# Patient Record
Sex: MALE | Race: WHITE | NOT HISPANIC OR LATINO | Employment: FULL TIME | URBAN - METROPOLITAN AREA
[De-identification: names, ages, dates, MRNs, and addresses within clinical notes are randomized per-mention and may not be internally consistent; named-entity substitution may affect disease eponyms.]

---

## 2023-11-27 ENCOUNTER — OFFICE VISIT (OUTPATIENT)
Dept: PEDIATRICS | Facility: CLINIC | Age: 18
End: 2023-11-27
Payer: COMMERCIAL

## 2023-11-27 ENCOUNTER — ANCILLARY PROCEDURE (OUTPATIENT)
Dept: CT IMAGING | Facility: CLINIC | Age: 18
End: 2023-11-27
Attending: STUDENT IN AN ORGANIZED HEALTH CARE EDUCATION/TRAINING PROGRAM
Payer: COMMERCIAL

## 2023-11-27 ENCOUNTER — OFFICE VISIT (OUTPATIENT)
Dept: URGENT CARE | Facility: URGENT CARE | Age: 18
End: 2023-11-27
Payer: COMMERCIAL

## 2023-11-27 ENCOUNTER — NURSE TRIAGE (OUTPATIENT)
Dept: NURSING | Facility: CLINIC | Age: 18
End: 2023-11-27

## 2023-11-27 VITALS
HEART RATE: 103 BPM | TEMPERATURE: 97.2 F | DIASTOLIC BLOOD PRESSURE: 73 MMHG | OXYGEN SATURATION: 99 % | SYSTOLIC BLOOD PRESSURE: 126 MMHG | RESPIRATION RATE: 18 BRPM

## 2023-11-27 VITALS
OXYGEN SATURATION: 98 % | WEIGHT: 184 LBS | HEART RATE: 89 BPM | DIASTOLIC BLOOD PRESSURE: 88 MMHG | TEMPERATURE: 99.1 F | RESPIRATION RATE: 16 BRPM | SYSTOLIC BLOOD PRESSURE: 134 MMHG

## 2023-11-27 DIAGNOSIS — R07.0 THROAT PAIN: Primary | ICD-10-CM

## 2023-11-27 DIAGNOSIS — R31.0 GROSS HEMATURIA: Primary | ICD-10-CM

## 2023-11-27 DIAGNOSIS — R31.0 GROSS HEMATURIA: ICD-10-CM

## 2023-11-27 DIAGNOSIS — Z11.3 SCREEN FOR STD (SEXUALLY TRANSMITTED DISEASE): ICD-10-CM

## 2023-11-27 DIAGNOSIS — M54.50 ACUTE LOW BACK PAIN, UNSPECIFIED BACK PAIN LATERALITY, UNSPECIFIED WHETHER SCIATICA PRESENT: ICD-10-CM

## 2023-11-27 DIAGNOSIS — R80.8 OTHER PROTEINURIA: ICD-10-CM

## 2023-11-27 DIAGNOSIS — R82.998 DARK BROWN URINE: ICD-10-CM

## 2023-11-27 LAB
ALBUMIN SERPL BCG-MCNC: 4.4 G/DL (ref 3.5–5.2)
ALBUMIN UR-MCNC: >=300 MG/DL
ALP SERPL-CCNC: 60 U/L (ref 40–150)
ALT SERPL W P-5'-P-CCNC: 10 U/L (ref 0–70)
ANION GAP SERPL CALCULATED.3IONS-SCNC: 12 MMOL/L (ref 7–15)
APPEARANCE UR: ABNORMAL
AST SERPL W P-5'-P-CCNC: 23 U/L (ref 0–45)
BILIRUB SERPL-MCNC: 0.8 MG/DL
BILIRUB UR QL STRIP: NEGATIVE
BUN SERPL-MCNC: 12.1 MG/DL (ref 6–20)
CALCIUM SERPL-MCNC: 9.5 MG/DL (ref 8.6–10)
CHLORIDE SERPL-SCNC: 98 MMOL/L (ref 98–107)
COLOR UR AUTO: ABNORMAL
CREAT SERPL-MCNC: 0.96 MG/DL (ref 0.67–1.17)
CRP SERPL-MCNC: 79.9 MG/L
DEPRECATED HCO3 PLAS-SCNC: 27 MMOL/L (ref 22–29)
DEPRECATED S PYO AG THROAT QL EIA: NEGATIVE
EGFRCR SERPLBLD CKD-EPI 2021: >90 ML/MIN/1.73M2
ERYTHROCYTE [DISTWIDTH] IN BLOOD BY AUTOMATED COUNT: 11.6 % (ref 10–15)
FLUAV AG SPEC QL IA: NEGATIVE
FLUBV AG SPEC QL IA: NEGATIVE
GLUCOSE SERPL-MCNC: 90 MG/DL (ref 70–99)
GLUCOSE UR STRIP-MCNC: NEGATIVE MG/DL
GROUP A STREP BY PCR: NOT DETECTED
HCT VFR BLD AUTO: 46 % (ref 40–53)
HGB BLD-MCNC: 16.1 G/DL (ref 13.3–17.7)
HGB UR QL STRIP: ABNORMAL
KETONES UR STRIP-MCNC: NEGATIVE MG/DL
LEUKOCYTE ESTERASE UR QL STRIP: NEGATIVE
MCH RBC QN AUTO: 30.2 PG (ref 26.5–33)
MCHC RBC AUTO-ENTMCNC: 35 G/DL (ref 31.5–36.5)
MCV RBC AUTO: 86 FL (ref 78–100)
NITRATE UR QL: NEGATIVE
PH UR STRIP: 7 [PH] (ref 5–7)
PLATELET # BLD AUTO: 196 10E3/UL (ref 150–450)
POTASSIUM SERPL-SCNC: 4.5 MMOL/L (ref 3.4–5.3)
PROT SERPL-MCNC: 7.9 G/DL (ref 6.4–8.3)
RBC # BLD AUTO: 5.33 10E6/UL (ref 4.4–5.9)
RBC #/AREA URNS AUTO: >100 /HPF
SODIUM SERPL-SCNC: 137 MMOL/L (ref 135–145)
SP GR UR STRIP: 1.01 (ref 1–1.03)
UROBILINOGEN UR STRIP-ACNC: 1 E.U./DL
WBC # BLD AUTO: 10.2 10E3/UL (ref 4–11)
WBC #/AREA URNS AUTO: ABNORMAL /HPF

## 2023-11-27 PROCEDURE — 74176 CT ABD & PELVIS W/O CONTRAST: CPT | Mod: GC | Performed by: RADIOLOGY

## 2023-11-27 PROCEDURE — 86780 TREPONEMA PALLIDUM: CPT | Performed by: PHYSICIAN ASSISTANT

## 2023-11-27 PROCEDURE — 87804 INFLUENZA ASSAY W/OPTIC: CPT | Performed by: PHYSICIAN ASSISTANT

## 2023-11-27 PROCEDURE — 86803 HEPATITIS C AB TEST: CPT | Performed by: PHYSICIAN ASSISTANT

## 2023-11-27 PROCEDURE — 99207 REFERRAL TO ACUTE AND DIAGNOSTIC SERVICES: CPT | Performed by: PHYSICIAN ASSISTANT

## 2023-11-27 PROCEDURE — 85027 COMPLETE CBC AUTOMATED: CPT | Performed by: STUDENT IN AN ORGANIZED HEALTH CARE EDUCATION/TRAINING PROGRAM

## 2023-11-27 PROCEDURE — 86160 COMPLEMENT ANTIGEN: CPT | Performed by: PHYSICIAN ASSISTANT

## 2023-11-27 PROCEDURE — 87591 N.GONORRHOEAE DNA AMP PROB: CPT | Performed by: PHYSICIAN ASSISTANT

## 2023-11-27 PROCEDURE — 86036 ANCA SCREEN EACH ANTIBODY: CPT | Performed by: PHYSICIAN ASSISTANT

## 2023-11-27 PROCEDURE — 99205 OFFICE O/P NEW HI 60 MIN: CPT | Performed by: STUDENT IN AN ORGANIZED HEALTH CARE EDUCATION/TRAINING PROGRAM

## 2023-11-27 PROCEDURE — 86140 C-REACTIVE PROTEIN: CPT | Performed by: STUDENT IN AN ORGANIZED HEALTH CARE EDUCATION/TRAINING PROGRAM

## 2023-11-27 PROCEDURE — 87635 SARS-COV-2 COVID-19 AMP PRB: CPT | Performed by: PHYSICIAN ASSISTANT

## 2023-11-27 PROCEDURE — 87651 STREP A DNA AMP PROBE: CPT | Performed by: PHYSICIAN ASSISTANT

## 2023-11-27 PROCEDURE — 84156 ASSAY OF PROTEIN URINE: CPT | Performed by: PHYSICIAN ASSISTANT

## 2023-11-27 PROCEDURE — 87086 URINE CULTURE/COLONY COUNT: CPT | Performed by: PHYSICIAN ASSISTANT

## 2023-11-27 PROCEDURE — 86160 COMPLEMENT ANTIGEN: CPT | Mod: 59 | Performed by: PHYSICIAN ASSISTANT

## 2023-11-27 PROCEDURE — 86038 ANTINUCLEAR ANTIBODIES: CPT | Performed by: PHYSICIAN ASSISTANT

## 2023-11-27 PROCEDURE — 81001 URINALYSIS AUTO W/SCOPE: CPT | Performed by: PHYSICIAN ASSISTANT

## 2023-11-27 PROCEDURE — 36415 COLL VENOUS BLD VENIPUNCTURE: CPT | Performed by: PHYSICIAN ASSISTANT

## 2023-11-27 PROCEDURE — 86037 ANCA TITER EACH ANTIBODY: CPT | Performed by: PHYSICIAN ASSISTANT

## 2023-11-27 PROCEDURE — 80053 COMPREHEN METABOLIC PANEL: CPT | Performed by: STUDENT IN AN ORGANIZED HEALTH CARE EDUCATION/TRAINING PROGRAM

## 2023-11-27 PROCEDURE — 87491 CHLMYD TRACH DNA AMP PROBE: CPT | Performed by: PHYSICIAN ASSISTANT

## 2023-11-27 PROCEDURE — 87389 HIV-1 AG W/HIV-1&-2 AB AG IA: CPT | Performed by: PHYSICIAN ASSISTANT

## 2023-11-27 ASSESSMENT — PAIN SCALES - GENERAL: PAINLEVEL: EXTREME PAIN (8)

## 2023-11-27 NOTE — PROGRESS NOTES
Assessment & Plan     Gross hematuria  Patient is having worsening low back pain over the weekend that feels different than his normal sciatic back pain. He also has gross hematuria. Discussed case with [facility] and they will accept patient for further workup to rule out kidney stone. Patient agrees with plan and will go by private car.     - Referral to Acute and Diagnostic Services (Day of diagnostic / First order acute); Future  - ANCA IgG by IFA with Reflex to Titer  - Anti Nuclear Maria Del Carmen IgG by IFA with Reflex  - Protein  random urine  - Complement C3  - Complement C4    Acute low back pain, unspecified back pain laterality, unspecified whether sciatica present    - Referral to Acute and Diagnostic Services (Day of diagnostic / First order acute); Future    Throat pain    - Streptococcus A Rapid Screen w/Reflex to PCR - Clinic Collect  - Influenza A & B Antigen - Clinic Collect  - Symptomatic COVID-19 Virus (Coronavirus) by PCR Nose  - Group A Streptococcus PCR Throat Swab    Dark brown urine    - UA Macroscopic with reflex to Microscopic and Culture - Clinic Collect  - Chlamydia trachomatis PCR - Clinic Collect  - Neisseria gonorrhoeae PCR - Clinic Collect  - UA Microscopic with Reflex to Culture  - Urine Culture Aerobic Bacterial - lab collect; Future  - Urine Culture Aerobic Bacterial - lab collect    Screen for STD (sexually transmitted disease)    - HIV Antigen Antibody Combo; Future  - Treponema Abs w Reflex to RPR and Titer; Future  - Hepatitis C Screen Reflex to HCV RNA Quant and Genotype; Future  - Hepatitis C Screen Reflex to HCV RNA Quant and Genotype  - Treponema Abs w Reflex to RPR and Titer  - HIV Antigen Antibody Combo    Other proteinuria    - ANCA IgG by IFA with Reflex to Titer  - Anti Nuclear Maria Del Carmen IgG by IFA with Reflex  - Protein  random urine  - Complement C3  - Complement C4      60 minutes spent by me on the date of the encounter doing chart review, history and exam, documentation and  further activities per the note           No follow-ups on file.    GUERITA Robles [facility]              Subjective   Chief Complaint   Patient presents with    Pharyngitis     3 days ago had cold-like symptoms, then got more sick with high fever, body aches and sore throat.  Urine is discolored and cloudy.       HPI     Fever and dark urine     Onset of symptoms was 1 day(s) ago.  Course of illness is same.    Severity moderate  Current and Associated symptoms: fever, sore throat, body aches  Treatment measures tried include Tylenol/Ibuprofen.  Predisposing factors include None.    Friday night noticed dark urine, no frequency, urgency, burning. Started pushing fluids and noticed urine lightening up. Has been having worsening low back pain. He does have history of disc herniation and had surgery several years ago. He always has some tightness in low back but this pain feels different.                 Review of Systems         Objective    /88   Pulse 89   Temp 99.1  F (37.3  C) (Tympanic)   Resp 16   Wt 83.5 kg (184 lb)   SpO2 98%   There is no height or weight on file to calculate BMI.  Physical Exam  Constitutional:       General: He is not in acute distress.     Appearance: He is well-developed.   HENT:      Head: Normocephalic and atraumatic.      Right Ear: Tympanic membrane and ear canal normal.      Left Ear: Tympanic membrane and ear canal normal.   Eyes:      Conjunctiva/sclera: Conjunctivae normal.   Cardiovascular:      Rate and Rhythm: Normal rate and regular rhythm.   Pulmonary:      Effort: Pulmonary effort is normal.      Breath sounds: Normal breath sounds.   Musculoskeletal:      Comments: There is left low back pain with palpation that patient states is chronic, new pain is not palpable. No CVA tenderness.    Skin:     General: Skin is warm and dry.      Findings: No rash.   Psychiatric:         Behavior: Behavior normal.

## 2023-11-27 NOTE — PROGRESS NOTES
Assessment & Plan     Gross hematuria  Patient referred to [facility] for further evaluation of cola colored urine for the past 2 days.  He had greater than 100 RBCs on UA at urgent care and greater than 300 protein on UA.  I ordered noncontrast CT to rule out kidney stone as the cause for the hematuria and the CT is negative.  CRP is elevated at 79.  His creatinine and GFR are normal.  I called and talked to the  and had him take a look at the urine under the microscope again and confirmed that there are no abnormal red blood cell morphology or acanthocytes seen.  I am concerned about glomerulonephritis as the cause for the cola colored urine and proteinuria.  I called to consult with on-call nephrologist Dr. Bui and given he is not hypertensive and kidney function tests are normal he does not need to be seen in the emergency department tonight but needs emergent nephrology evaluation to be seen in the next 1 to 2 days.  Referral was placed and patient advised to wait for call from  to set up this appointment.  Dr. Bui are also advised on additional urine test for protein/creatinine ratio and lab can add this on and blood tests including C3, C4, ANCA and LUZMARIA to help discern whether this is glomerulonephritis.  It looks like we are able to add on C3, C4 ANCA and LUZMARIA to the blood in lab. Called and talked to patient to update on the additional tests added and to wait for the call from the nephrology  with goal of patient being seen by nephrology in the next couple days per recommendation of Dr. Bui.   - CT Abdomen Pelvis w/o Contrast; Future  - CBC with platelets; Future  - Comprehensive metabolic panel; Future  - Comprehensive metabolic panel  - CBC with platelets  - ESR: Erythrocyte sedimentation rate; Future  - CRP, inflammation; Future  - CRP, inflammation  - Adult Urology  Referral; Future  - Adult Nephrology  Referral; Future  - Protein  random urine; Future  -  Complement C3; Future  - Complement C4; Future  - ANCA IgG by IFA with Reflex to Titer; Future  - Anti Nuclear Maria Del Carmen IgG by IFA with Reflex; Future    Other proteinuria  - Adult Urology  Referral; Future  - Adult Nephrology  Referral; Future  - Protein  random urine; Future  - Complement C3; Future  - Complement C4; Future  - ANCA IgG by IFA with Reflex to Titer; Future  - Anti Nuclear Maria Del Carmen IgG by IFA with Reflex; Future      60 minutes spent by me on the date of the encounter doing chart review, review of test results, interpretation of tests, patient visit, documentation, and discussion with other provider(s)        There are no Patient Instructions on file for this visit.    No follow-ups on file.    DEB Chin [facility] GABY Mao is a 18 year old, presenting for the following health issues:  Kidney Problem      HPI     Concern - R/O Kidney Stone  Onset: cola colored urine started yesterday. He thought he was dehydrated so he started drinking a lot of fluids and electrolytes but the brown color to the urine persisted.  He has also had left lower back pain and sciatic pain down the left leg.  He has a history of lumbar radiculopathy and has had surgery to the lumbar spine in the past by [facility] 3 years ago.  He has had some general symptoms of not feeling well and the right side of his throat has been hurting.  He had a strep and influenza test done in urgent care earlier today that were negative.  Description: pain in lower back and left leg.  Intensity: severe, 8/10  Progression of Symptoms:  worsening and constant  Accompanying Signs & Symptoms: pain from back radiates to the left flank  Previous history of similar problem: No history of kidney stone but mother has had them in the past.  Precipitating factors:        Worsened by: extending the left leg or sitting to long.  Alleviating factors:        Improved by: none  Therapies  tried and outcome: None  Is not taking any supplements or over the counter medications.   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 These symptoms resolved with treatment by cardiology.     There is no problem list on file for this patient.    No current outpatient medications on file.     No current facility-administered medications for this visit.         Review of Systems   Constitutional, HEENT, cardiovascular, pulmonary, GI, , musculoskeletal, neuro, skin, endocrine and psych systems are negative, except as otherwise noted.      Objective    /73 (BP Location: Right arm, Patient Position: Sitting, Cuff Size: Adult Large)   Pulse 103   Temp 97.2  F (36.2  C) (Oral)   Resp 18   SpO2 99%   There is no height or weight on file to calculate BMI.  Physical Exam   GENERAL: healthy, alert and no distress  EYES: Eyes grossly normal to inspection, PERRL and conjunctivae and sclerae normal  NECK: tenderness to palpation of SCM muscle on right side, no adenopathy adjacent to SCM, no asymmetry, masses, or scars, and thyroid normal to palpation  RESP: lungs clear to auscultation - no rales, rhonchi or wheezes  CV: regular rate and rhythm, normal S1 S2, no S3 or S4, no murmur, click or rub, no peripheral edema   ABDOMEN: soft, nontender, no hepatosplenomegaly, no masses  MS: no gross musculoskeletal defects noted, no edema  SKIN: no suspicious lesions or rashes  NEURO: Normal strength and tone, mentation intact and speech normal  BACK: left lumbar paraspinal tenderness  PSYCH: mentation appears normal, affect normal/bright    Results for orders placed or performed in visit on 11/27/23   CT Abdomen Pelvis w/o Contrast     Status: None (Preliminary result)    Narrative    EXAMINATION: CT ABDOMEN PELVIS W/O CONTRAST  11/27/2023 4:59 PM      CLINICAL HISTORY: hematuria r/o kidney stone; Gross hematuria    COMPARISON: None        PROCEDURE COMMENTS: CT of the chest,  abdomen, and pelvis was performed  without intravenous contrast. Axial MIP  images of the chest, and  coronal and sagittal reformatted images of the chest, abdomen, and  pelvis obtained. Exam was performed in the prone position.    FINDINGS:    Support devices: None.    Lungs: No pneumothorax or pleural effusion. No focal pulmonary  consolidation. Normal heart size. No pericardial effusion.    Abdomen/pelvis:  The liver and biliary system, spleen, pancreas are normal in  appearance.  The adrenal glands and kidneys are normal in appearance. The bladder  is unremarkable.    There are no abnormally dilated or thickened loops of small bowel or  colon.    There is no free air or free fluid. There are no abnormally sized  lymph nodes.    Bones:   Normal.      Impression    IMPRESSION:  Normal CT of the abdomen and pelvis, specifically, there is no  evidence of obstructive nephrolithiasis.   Results for orders placed or performed in visit on 11/27/23   Comprehensive metabolic panel     Status: Normal   Result Value Ref Range    Sodium 137 135 - 145 mmol/L    Potassium 4.5 3.4 - 5.3 mmol/L    Carbon Dioxide (CO2) 27 22 - 29 mmol/L    Anion Gap 12 7 - 15 mmol/L    Urea Nitrogen 12.1 6.0 - 20.0 mg/dL    Creatinine 0.96 0.67 - 1.17 mg/dL    GFR Estimate >90 >60 mL/min/1.73m2    Calcium 9.5 8.6 - 10.0 mg/dL    Chloride 98 98 - 107 mmol/L    Glucose 90 70 - 99 mg/dL    Alkaline Phosphatase 60 40 - 150 U/L    AST 23 0 - 45 U/L    ALT 10 0 - 70 U/L    Protein Total 7.9 6.4 - 8.3 g/dL    Albumin 4.4 3.5 - 5.2 g/dL    Bilirubin Total 0.8 <=1.2 mg/dL   CBC with platelets     Status: Normal   Result Value Ref Range    WBC Count 10.2 4.0 - 11.0 10e3/uL    RBC Count 5.33 4.40 - 5.90 10e6/uL    Hemoglobin 16.1 13.3 - 17.7 g/dL    Hematocrit 46.0 40.0 - 53.0 %    MCV 86 78 - 100 fL    MCH 30.2 26.5 - 33.0 pg    MCHC 35.0 31.5 - 36.5 g/dL    RDW 11.6 10.0 - 15.0 %    Platelet Count 196 150 - 450 10e3/uL   CRP, inflammation     Status:  Abnormal   Result Value Ref Range    CRP Inflammation 79.90 (H) <5.00 mg/L   Results for orders placed or performed in visit on 11/27/23   UA Macroscopic with reflex to Microscopic and Culture - Clinic Collect     Status: Abnormal    Specimen: Urine, Clean Catch   Result Value Ref Range    Color Urine Brown (A) Colorless, Straw, Light Yellow, Yellow    Appearance Urine Slightly Cloudy (A) Clear    Glucose Urine Negative Negative mg/dL    Bilirubin Urine Negative Negative    Ketones Urine Negative Negative mg/dL    Specific Gravity Urine 1.015 1.003 - 1.035    Blood Urine Large (A) Negative    pH Urine 7.0 5.0 - 7.0    Protein Albumin Urine >=300 (A) Negative mg/dL    Urobilinogen Urine 1.0 0.2, 1.0 E.U./dL    Nitrite Urine Negative Negative    Leukocyte Esterase Urine Negative Negative   UA Microscopic with Reflex to Culture     Status: Abnormal   Result Value Ref Range    RBC Urine >100 (A) 0-2 /HPF /HPF    WBC Urine 0-5 0-5 /HPF /HPF    Narrative    Urine Culture not indicated   Streptococcus A Rapid Screen w/Reflex to PCR - Clinic Collect     Status: Normal    Specimen: Throat; Swab   Result Value Ref Range    Group A Strep antigen Negative Negative   Influenza A & B Antigen - Clinic Collect     Status: Normal    Specimen: Nose; Swab   Result Value Ref Range    Influenza A antigen Negative Negative    Influenza B antigen Negative Negative    Narrative    Test results must be correlated with clinical data. If necessary, results should be confirmed by a molecular assay or viral culture.

## 2023-11-27 NOTE — LETTER
November 27, 2023      Mayco Castro  [address] [address]        To Whom It May Concern:    Mayco Castro  was seen on 11/27/23 in urgent care and the acute diagnostic clinic. Mayco needs additional work up for his condition and I advised he does not return to work until further evaluation by the specialist. Please excuse his absences from work due to acute medical condition that needs prompt evaluation and treatment.       Sincerely,    EDB Chin CNP

## 2023-11-28 ENCOUNTER — TELEPHONE (OUTPATIENT)
Dept: UROLOGY | Facility: CLINIC | Age: 18
End: 2023-11-28
Payer: COMMERCIAL

## 2023-11-28 LAB
ALBUMIN MFR UR ELPH: 166.3 MG/DL
ANA SER QL IF: NEGATIVE
C TRACH DNA SPEC QL NAA+PROBE: NEGATIVE
C3 SERPL-MCNC: 125 MG/DL (ref 81–157)
C4 SERPL-MCNC: 29 MG/DL (ref 13–39)
CREAT UR-MCNC: 90.8 MG/DL
HCV AB SERPL QL IA: NONREACTIVE
HIV 1+2 AB+HIV1 P24 AG SERPL QL IA: NONREACTIVE
N GONORRHOEA DNA SPEC QL NAA+PROBE: NEGATIVE
PROT/CREAT 24H UR: 1.83 MG/MG CR (ref 0–0.2)
SARS-COV-2 RNA RESP QL NAA+PROBE: NEGATIVE
T PALLIDUM AB SER QL: NONREACTIVE

## 2023-11-28 NOTE — TELEPHONE ENCOUNTER
Patient requesting a doctor's note from his visit today faxed to his work.  He needs it to arrive by 6 am 11-28-23.    Fax #:  [phone]    Will send message high priority.    Maggie Escobedo RN  [facility]

## 2023-11-28 NOTE — TELEPHONE ENCOUNTER
Please see note below from Sara Vences PA-C in [facility].  The soonest available appointment we have is 12/5/23. Patient added to wait list.    Per Sara, patient's NEPHROLOGY consult should take precedence. I don't see that this has been scheduled yet.    Ernestina Landon RN on 11/28/2023 at 11:50 AM

## 2023-11-28 NOTE — TELEPHONE ENCOUNTER
[place] site does not have urgent availability at this time.  Upon further chart review.  Tel Enc was also sent to [facility] in [facility].  Routing msg has been sent to Dr. Holland for review and advise regarding urgent appointment.    Janeth Mejia LPN

## 2023-11-28 NOTE — TELEPHONE ENCOUNTER
This encounter is being sent to inform the clinic that this patient has a referral from     Paloma Brown APRN CNP    for the diagnoses of gross hematuria and has requested that this patient be seen within 1-2 days.    Based on the availability of our provider(s), we are unable to accommodate this request.    Were all sites offered this patient?  Yes    Does scheduling algorithm request to schedule next available?  Patient has been scheduled for the first available opening with [facility] on 12/6/23.  We have informed the patient that the clinic will review their referral and reach out if a sooner appointment is medically necessary.

## 2023-11-28 NOTE — TELEPHONE ENCOUNTER
Rcvd a call back from pts spouse Georgina following up on a referral that was placed for pt yesterday. Pt was told that they needed to be seen within a couple of days with urology and that a stat referral was placed but when they try to schedule this they are not able to get him in until December 5th. They are not sure what to do. Does he need to go to ER or what to do. Pt is present to give verbal CTC to speak with spouse. He is not feeling well so Georgina is trying to help get things coordinated for him.  Please call pt at [phone] ok per pt to [facility] on that number.

## 2023-11-28 NOTE — TELEPHONE ENCOUNTER
There is a work letter in his chart. Please fax the number provided in the nurse triage note.  Thanks!  Lucie Brown CNP

## 2023-11-28 NOTE — TELEPHONE ENCOUNTER
Patient seen by PCP on 11/27 for gross hematuria and proteinuria.    Referrals placed for both Urology and Nephrology.  See OV note, 11/27, for further details.     Some labs are still pending. CT of abd/pelvis normal; no nephrolithiasis.    Patient currently has a Urology appointment with Sara Vences on 12/6 for gross hematuria.  I don't see that he has a Nephrology appointment set up yet.    Is appointment on 12/6 appropriate? Please advise.    Ernestina Landon RN on 11/28/2023 at 11:36 AM

## 2023-11-28 NOTE — TELEPHONE ENCOUNTER
His appointment with me will only be initial evaluation. He will need urine testing, contrast enhanced CT, and cystoscopy (we are scheduling into January/February for this in [place]). Based on the note from his PCP, his nephrology recommendation would be more urgent.

## 2023-11-29 ENCOUNTER — TELEPHONE (OUTPATIENT)
Dept: NEPHROLOGY | Facility: CLINIC | Age: 18
End: 2023-11-29
Payer: COMMERCIAL

## 2023-11-29 LAB
ANCA AB PATTERN SER IF-IMP: NORMAL
BACTERIA UR CULT: NORMAL
C-ANCA TITR SER IF: NORMAL {TITER}

## 2023-11-29 NOTE — TELEPHONE ENCOUNTER
Patient read [employer] message:    Info regarding Urology Consult-- 11/3023 at 0830 with Sara Vences PA-C  Message [phone]  From  Ernestina Landon RN To  Mayco Castro Sent and Delivered  11/29/2023 10:57 AM   Last Read in RECESS.  11/29/2023 10:57 AM by Mayco Castro       Closing note.  Ernestina Landon RN on 11/29/2023 at 11:31 AM

## 2023-11-29 NOTE — CONFIDENTIAL NOTE
DIAGNOSIS:    Gross hematuria   Other proteinuria      DATE RECEIVED:  12.14.2023    NOTES STATUS DETAILS   OFFICE NOTE from referring provider Internal 11.27.2023 Paloma Brown, DEB CNP    OFFICE NOTE from other specialist      *Only VASCULITIS or LUPUS gather office notes for the following     *PULMONARY       *ENT     *DERMATOLOGY     *RHEUMATOLOGY     DISCHARGE SUMMARY from hospital     DISCHARGE REPORT from the ER     MEDICATION LIST     IMAGING  (NEED IMAGES AND REPORTS)     KIDNEY CT SCAN Internal 11.27.2023 CT ABDOMEN PELVIS W/O CONTRAST    KIDNEY ULTRASOUND     MR ABDOMEN     NUCLEAR MEDICINE RENAL     LABS     CBC Internal 11.27.2023    CMP Internal 11.27.2023    BMP Care Everywhere 08.25.2023    UA Internal 11.27.2023   URINE PROTEIN Internal 11.27.2023    RENAL PANEL     BIOPSY     KIDNEY BIOPSY

## 2023-11-29 NOTE — TELEPHONE ENCOUNTER
Patient Contacted    Appointment type: [facility]  Provider: YASMIN  Return date: 12/14/23  Specialty phone number: [phone]  Additional appointment(s) needed: LAB  Additonal Notes: per RN/referral.

## 2023-11-29 NOTE — TELEPHONE ENCOUNTER
Patient now scheduled to see Sara Vences in Urology on 11/30 @ 0830.    [facility] Sent message regarding appointment information, what to expect at the initial consult visit, etc.    Patient also has a Nephrology consult scheduled for 12/14.     Ernestina Landon RN on 11/29/2023 at 10:59 AM

## 2023-11-30 ENCOUNTER — TELEPHONE (OUTPATIENT)
Dept: UROLOGY | Facility: CLINIC | Age: 18
End: 2023-11-30

## 2023-11-30 ENCOUNTER — OFFICE VISIT (OUTPATIENT)
Dept: UROLOGY | Facility: CLINIC | Age: 18
End: 2023-11-30
Attending: STUDENT IN AN ORGANIZED HEALTH CARE EDUCATION/TRAINING PROGRAM
Payer: COMMERCIAL

## 2023-11-30 VITALS
SYSTOLIC BLOOD PRESSURE: 118 MMHG | DIASTOLIC BLOOD PRESSURE: 77 MMHG | OXYGEN SATURATION: 95 % | HEART RATE: 76 BPM | WEIGHT: 184 LBS | TEMPERATURE: 97.7 F | BODY MASS INDEX: 24.92 KG/M2 | HEIGHT: 72 IN

## 2023-11-30 DIAGNOSIS — R31.0 GROSS HEMATURIA: Primary | ICD-10-CM

## 2023-11-30 DIAGNOSIS — R80.8 OTHER PROTEINURIA: ICD-10-CM

## 2023-11-30 DIAGNOSIS — R31.0 GROSS HEMATURIA: ICD-10-CM

## 2023-11-30 LAB
ALBUMIN UR-MCNC: >=300 MG/DL
APPEARANCE UR: ABNORMAL
BACTERIA #/AREA URNS HPF: ABNORMAL /HPF
BILIRUB UR QL STRIP: NEGATIVE
COLOR UR AUTO: YELLOW
GLUCOSE UR STRIP-MCNC: NEGATIVE MG/DL
HGB UR QL STRIP: ABNORMAL
KETONES UR STRIP-MCNC: NEGATIVE MG/DL
LEUKOCYTE ESTERASE UR QL STRIP: NEGATIVE
MUCOUS THREADS #/AREA URNS LPF: PRESENT /LPF
NITRATE UR QL: NEGATIVE
PH UR STRIP: 6.5 [PH] (ref 5–7)
RBC #/AREA URNS AUTO: ABNORMAL /HPF
SP GR UR STRIP: 1.01 (ref 1–1.03)
SQUAMOUS #/AREA URNS AUTO: ABNORMAL /LPF
UROBILINOGEN UR STRIP-ACNC: 0.2 E.U./DL
WBC #/AREA URNS AUTO: ABNORMAL /HPF

## 2023-11-30 PROCEDURE — 88112 CYTOPATH CELL ENHANCE TECH: CPT | Performed by: PATHOLOGY

## 2023-11-30 PROCEDURE — 99203 OFFICE O/P NEW LOW 30 MIN: CPT | Performed by: STUDENT IN AN ORGANIZED HEALTH CARE EDUCATION/TRAINING PROGRAM

## 2023-11-30 PROCEDURE — 81001 URINALYSIS AUTO W/SCOPE: CPT | Performed by: STUDENT IN AN ORGANIZED HEALTH CARE EDUCATION/TRAINING PROGRAM

## 2023-11-30 ASSESSMENT — PAIN SCALES - GENERAL: PAINLEVEL: NO PAIN (0)

## 2023-11-30 NOTE — PATIENT INSTRUCTIONS
Call to schedule CT Urogram. There are three different numbers, depending on which location you prefer.    [phone]: [place]  [phone][facility]  [phone]: [facility]    A message will be sent to central scheduling to find you the soonest Cystoscopy available. Someone should be giving you a call within the next few days.

## 2023-11-30 NOTE — PROGRESS NOTES
"        Chief Complaint:   Gross hematuria         History of Present Illness:   Mayco Castro is a 18 year old male with no significant past medical history who presents for evaluation of gross hematuria.     The patient was seen by primary care on 11/27/2023 for gross hematuria. UA was notable for proteinuria and >100 RBCs. Urine culture was negative. Creatinine and GFR were WNL. CT abdomen pelvis without contrast was unremarkable. The patient has also been referred to nephrology.     The patient states his urine cleared yesterday, but is still foamy. He currently denies any dysuria, pyuria, hesitancy, intermittency, feelings of incomplete emptying, or any recent history of urinary tract infections or stones.    He is a never smoker.          Past Medical History:   No past medical history on file.         Past Surgical History:   No past surgical history on file.         Medications     No current outpatient medications on file.     No current facility-administered medications for this visit.            Allergies:   Patient has no known allergies.         Review of Systems:  From intake questionnaire   Negative 14 system review except as noted on HPI, nurse's note.         Physical Exam:   Patient is a 18 year old  male   Vitals: Blood pressure 118/77, pulse 76, temperature 97.7  F (36.5  C), temperature source Tympanic, height 1.835 m (6' 0.25\"), weight 83.5 kg (184 lb), SpO2 95%.  General Appearance Adult: Alert, no acute distress, oriented.  Lungs: Non-labored breathing.  Heart: No obvious jugular venous distension present.  Neuro: Alert, oriented, speech and mentation normal      Labs and Pathology:    I personally reviewed all applicable laboratory data and went over findings with patient  Significant for:    CBC RESULTS:  Recent Labs   Lab Test 11/27/23  1615   WBC 10.2   HGB 16.1           BMP RESULTS:  Recent Labs   Lab Test 11/27/23  1615      POTASSIUM 4.5   CHLORIDE 98   CO2 27   ANIONGAP " 12   GLC 90   BUN 12.1   CR 0.96   GFRESTIMATED >90   CIERRA 9.5       UA RESULTS:   Recent Labs   Lab Test 11/27/23  1256   SG 1.015   URINEPH 7.0   NITRITE Negative   RBCU >100*   WBCU 0-5         Imaging:    I personally reviewed all applicable imaging and went over findings with patient.  Significant for:    Results for orders placed or performed in visit on 11/27/23   CT Abdomen Pelvis w/o Contrast    Narrative    EXAMINATION: CT ABDOMEN PELVIS W/O CONTRAST  11/27/2023 4:59 PM      CLINICAL HISTORY: hematuria r/o kidney stone; Gross hematuria    COMPARISON: None        PROCEDURE COMMENTS: CT of the chest, abdomen, and pelvis was performed  without intravenous contrast. Axial MIP  images of the chest, and  coronal and sagittal reformatted images of the chest, abdomen, and  pelvis obtained. Exam was performed in the prone position.    FINDINGS:    Support devices: None.    Lungs: No pneumothorax or pleural effusion. No focal pulmonary  consolidation. Normal heart size. No pericardial effusion.    Abdomen/pelvis:  The liver and biliary system, spleen, pancreas are normal in  appearance.  The adrenal glands and kidneys are normal in appearance. The bladder  is unremarkable.    There are no abnormally dilated or thickened loops of small bowel or  colon.    There is no free air or free fluid. There are no abnormally sized  lymph nodes.    Bones:   Normal.      Impression    IMPRESSION:  Normal CT of the abdomen and pelvis, specifically, there is no  evidence of obstructive nephrolithiasis.    I have personally reviewed the examination and initial interpretation  and I agree with the findings.    VIOLETTA REYNA MD         SYSTEM ID:  [id number]            Assessment and Plan:     Assessment: Mr. Mayco Castro is a pleasant 18 year old male with gross hematuria that was present for 3-4 days and resolved yesterday. The patient was seen by primary care on 11/27/2023 for gross hematuria. UA was notable for proteinuria and  >100 RBCs. Urine culture was negative. Creatinine and GFR were WNL. CT abdomen pelvis without contrast was unremarkable. The patient has also been referred to nephrology.     Plan:  At this time, recommend proceeding with comprehensive hematuria evaluation to include:  - Urinalysis and urine culture to rule out an acute urinary tract infection.   - Urine cytology to look for cells concerning for malignancy.  - CT urogram for upper tract imaging.  - Cystoscopy to evaluate the interior of the bladder. Follow up for hematuria as recommended by urologist performing cystoscopic evaluation.  2.   Follow up with nephrology as scheduled.       CARLITO SAAB PA-C  Department of Urology

## 2023-11-30 NOTE — TELEPHONE ENCOUNTER
ERVIN and callback to schedule a cystoscopy for gross hematuria. He would like first available and he is willing to travel to other locations

## 2023-12-01 ENCOUNTER — MYC MEDICAL ADVICE (OUTPATIENT)
Dept: NEPHROLOGY | Facility: CLINIC | Age: 18
End: 2023-12-01
Payer: COMMERCIAL

## 2023-12-01 LAB
PATH REPORT.COMMENTS IMP SPEC: NORMAL
PATH REPORT.FINAL DX SPEC: NORMAL
PATH REPORT.GROSS SPEC: NORMAL
PATH REPORT.MICROSCOPIC SPEC OTHER STN: NORMAL
PATH REPORT.RELEVANT HX SPEC: NORMAL

## 2023-12-01 NOTE — TELEPHONE ENCOUNTER
Sent patient a My Chart message to remind him of all his scheduled appointments including his ultrasound // 12.01.2023 MCE

## 2023-12-08 ENCOUNTER — PRE VISIT (OUTPATIENT)
Dept: UROLOGY | Facility: CLINIC | Age: 18
End: 2023-12-08
Payer: COMMERCIAL

## 2023-12-08 NOTE — CONFIDENTIAL NOTE
Reason for visit: cystoscopy     Relevant information: gross hematuria    Records/imaging/labs/orders: in epic    At Rooming: collect urine    Ophelia Ceja  12/8/2023  2:48 PM

## 2023-12-12 ENCOUNTER — HOSPITAL ENCOUNTER (OUTPATIENT)
Dept: CT IMAGING | Facility: CLINIC | Age: 18
Discharge: HOME | End: 2023-12-12
Attending: STUDENT IN AN ORGANIZED HEALTH CARE EDUCATION/TRAINING PROGRAM | Admitting: STUDENT IN AN ORGANIZED HEALTH CARE EDUCATION/TRAINING PROGRAM
Payer: COMMERCIAL

## 2023-12-12 DIAGNOSIS — R31.0 GROSS HEMATURIA: ICD-10-CM

## 2023-12-12 PROCEDURE — 74178 CT ABD&PLV WO CNTR FLWD CNTR: CPT

## 2023-12-12 PROCEDURE — 250N000011 HC RX IP 250 OP 636: Performed by: STUDENT IN AN ORGANIZED HEALTH CARE EDUCATION/TRAINING PROGRAM

## 2023-12-12 PROCEDURE — 250N000009 HC RX 250: Performed by: RADIOLOGY

## 2023-12-12 RX ORDER — IOPAMIDOL 755 MG/ML
40 INJECTION, SOLUTION INTRAVASCULAR ONCE
Status: DISCONTINUED | OUTPATIENT
Start: 2023-12-12 | End: 2023-12-12

## 2023-12-12 RX ORDER — IOPAMIDOL 755 MG/ML
120 INJECTION, SOLUTION INTRAVASCULAR ONCE
Status: COMPLETED | OUTPATIENT
Start: 2023-12-12 | End: 2023-12-12

## 2023-12-12 RX ADMIN — SODIUM CHLORIDE 100 ML: 9 INJECTION, SOLUTION INTRAVENOUS at 14:30

## 2023-12-12 RX ADMIN — IOPAMIDOL 120 ML: 755 INJECTION, SOLUTION INTRAVENOUS at 14:30

## 2023-12-14 ENCOUNTER — OFFICE VISIT (OUTPATIENT)
Dept: NEPHROLOGY | Facility: CLINIC | Age: 18
End: 2023-12-14
Attending: INTERNAL MEDICINE
Payer: COMMERCIAL

## 2023-12-14 ENCOUNTER — LAB (OUTPATIENT)
Dept: LAB | Facility: CLINIC | Age: 18
End: 2023-12-14
Attending: INTERNAL MEDICINE
Payer: COMMERCIAL

## 2023-12-14 ENCOUNTER — ANCILLARY PROCEDURE (OUTPATIENT)
Dept: ULTRASOUND IMAGING | Facility: CLINIC | Age: 18
End: 2023-12-14
Attending: INTERNAL MEDICINE
Payer: COMMERCIAL

## 2023-12-14 ENCOUNTER — PRE VISIT (OUTPATIENT)
Dept: NEPHROLOGY | Facility: CLINIC | Age: 18
End: 2023-12-14

## 2023-12-14 VITALS
WEIGHT: 179 LBS | OXYGEN SATURATION: 97 % | SYSTOLIC BLOOD PRESSURE: 120 MMHG | HEART RATE: 65 BPM | BODY MASS INDEX: 24.11 KG/M2 | DIASTOLIC BLOOD PRESSURE: 73 MMHG

## 2023-12-14 DIAGNOSIS — R31.0 GROSS HEMATURIA: ICD-10-CM

## 2023-12-14 DIAGNOSIS — N02.B9 IGA NEPHROPATHY: Primary | ICD-10-CM

## 2023-12-14 DIAGNOSIS — R80.8 OTHER PROTEINURIA: ICD-10-CM

## 2023-12-14 LAB
ALBUMIN MFR UR ELPH: 32.4 MG/DL
ALBUMIN SERPL BCG-MCNC: 4.4 G/DL (ref 3.5–5.2)
ALBUMIN UR-MCNC: 20 MG/DL
ANION GAP SERPL CALCULATED.3IONS-SCNC: 12 MMOL/L (ref 7–15)
APPEARANCE UR: CLEAR
BILIRUB UR QL STRIP: NEGATIVE
BUN SERPL-MCNC: 15.4 MG/DL (ref 6–20)
CALCIUM SERPL-MCNC: 9.8 MG/DL (ref 8.6–10)
CHLORIDE SERPL-SCNC: 104 MMOL/L (ref 98–107)
COLOR UR AUTO: ABNORMAL
CREAT SERPL-MCNC: 0.86 MG/DL (ref 0.67–1.17)
CREAT UR-MCNC: 78.9 MG/DL
CREAT UR-MCNC: 79.3 MG/DL
CRP SERPL-MCNC: <3 MG/L
DEPRECATED HCO3 PLAS-SCNC: 25 MMOL/L (ref 22–29)
EGFRCR SERPLBLD CKD-EPI 2021: >90 ML/MIN/1.73M2
ERYTHROCYTE [DISTWIDTH] IN BLOOD BY AUTOMATED COUNT: 11.8 % (ref 10–15)
ERYTHROCYTE [SEDIMENTATION RATE] IN BLOOD BY WESTERGREN METHOD: 6 MM/HR (ref 0–15)
GLUCOSE SERPL-MCNC: 93 MG/DL (ref 70–99)
GLUCOSE UR STRIP-MCNC: NEGATIVE MG/DL
HCT VFR BLD AUTO: 44.4 % (ref 40–53)
HGB BLD-MCNC: 15.8 G/DL (ref 13.3–17.7)
HGB UR QL STRIP: ABNORMAL
KETONES UR STRIP-MCNC: NEGATIVE MG/DL
LEUKOCYTE ESTERASE UR QL STRIP: NEGATIVE
MCH RBC QN AUTO: 29.9 PG (ref 26.5–33)
MCHC RBC AUTO-ENTMCNC: 35.6 G/DL (ref 31.5–36.5)
MCV RBC AUTO: 84 FL (ref 78–100)
MICROALBUMIN UR-MCNC: 182 MG/L
MICROALBUMIN/CREAT UR: 230.67 MG/G CR (ref 0–17)
NITRATE UR QL: NEGATIVE
PH UR STRIP: 6.5 [PH] (ref 5–7)
PHOSPHATE SERPL-MCNC: 3.3 MG/DL (ref 2.5–4.5)
PLATELET # BLD AUTO: 252 10E3/UL (ref 150–450)
POTASSIUM SERPL-SCNC: 4.5 MMOL/L (ref 3.4–5.3)
PROT/CREAT 24H UR: 0.41 MG/MG CR (ref 0–0.2)
RBC # BLD AUTO: 5.29 10E6/UL (ref 4.4–5.9)
RBC URINE: 37 /HPF
SODIUM SERPL-SCNC: 141 MMOL/L (ref 135–145)
SP GR UR STRIP: 1.01 (ref 1–1.03)
SPERM #/AREA URNS HPF: PRESENT /HPF
UROBILINOGEN UR STRIP-MCNC: NORMAL MG/DL
WBC # BLD AUTO: 5.7 10E3/UL (ref 4–11)
WBC URINE: 1 /HPF

## 2023-12-14 PROCEDURE — 85652 RBC SED RATE AUTOMATED: CPT | Performed by: PATHOLOGY

## 2023-12-14 PROCEDURE — 86060 ANTISTREPTOLYSIN O TITER: CPT | Mod: 90 | Performed by: PATHOLOGY

## 2023-12-14 PROCEDURE — 84156 ASSAY OF PROTEIN URINE: CPT | Performed by: PATHOLOGY

## 2023-12-14 PROCEDURE — 76770 US EXAM ABDO BACK WALL COMP: CPT | Mod: GC | Performed by: RADIOLOGY

## 2023-12-14 PROCEDURE — 87340 HEPATITIS B SURFACE AG IA: CPT | Performed by: INTERNAL MEDICINE

## 2023-12-14 PROCEDURE — 86215 DEOXYRIBONUCLEASE ANTIBODY: CPT | Mod: 90 | Performed by: PATHOLOGY

## 2023-12-14 PROCEDURE — 99205 OFFICE O/P NEW HI 60 MIN: CPT | Performed by: INTERNAL MEDICINE

## 2023-12-14 PROCEDURE — 36415 COLL VENOUS BLD VENIPUNCTURE: CPT | Performed by: PATHOLOGY

## 2023-12-14 PROCEDURE — 86160 COMPLEMENT ANTIGEN: CPT | Performed by: INTERNAL MEDICINE

## 2023-12-14 PROCEDURE — 99213 OFFICE O/P EST LOW 20 MIN: CPT | Performed by: INTERNAL MEDICINE

## 2023-12-14 PROCEDURE — 86225 DNA ANTIBODY NATIVE: CPT | Performed by: INTERNAL MEDICINE

## 2023-12-14 PROCEDURE — 85027 COMPLETE CBC AUTOMATED: CPT | Performed by: PATHOLOGY

## 2023-12-14 PROCEDURE — 81001 URINALYSIS AUTO W/SCOPE: CPT | Performed by: PATHOLOGY

## 2023-12-14 PROCEDURE — 86038 ANTINUCLEAR ANTIBODIES: CPT | Performed by: INTERNAL MEDICINE

## 2023-12-14 PROCEDURE — 99000 SPECIMEN HANDLING OFFICE-LAB: CPT | Performed by: PATHOLOGY

## 2023-12-14 PROCEDURE — 82570 ASSAY OF URINE CREATININE: CPT | Performed by: INTERNAL MEDICINE

## 2023-12-14 PROCEDURE — 80069 RENAL FUNCTION PANEL: CPT | Performed by: PATHOLOGY

## 2023-12-14 PROCEDURE — 86140 C-REACTIVE PROTEIN: CPT | Performed by: PATHOLOGY

## 2023-12-14 RX ORDER — LISINOPRIL 2.5 MG/1
2.5 TABLET ORAL DAILY
Qty: 30 TABLET | Refills: 6 | Status: SHIPPED | OUTPATIENT
Start: 2023-12-14 | End: 2024-03-18

## 2023-12-14 ASSESSMENT — PAIN SCALES - GENERAL: PAINLEVEL: NO PAIN (0)

## 2023-12-14 NOTE — PATIENT INSTRUCTIONS
You likely have IgA nephropathy  We will hold of on a kidney biopsy for now but if things change as we discussed , we may have to do that to ascertain the diagnosis  Will reach out to Dr. Timmons about your Cystoscopy tomorrow  See you in 3 months with labs

## 2023-12-14 NOTE — LETTER
12/14/2023       RE: Mayco Castro  [address] [address]     Dear Colleague,    Thank you for referring your patient, Mayco Castro, to the [facility] [place] at [facility]. Please see a copy of my visit note below.       Nephrology Initial Consult  December 14, 2023      Mayco Castro MRN:[id number] YOB: 2005  Primary care provider: [facility]  Requesting physician: Paloma Brown APRN CNP     REASON FOR CONSULT: Gross hematuria and proteinuria    HISTORY OF PRESENT ILLNESS:  Mayco Castro is a 18 year old with prior Hx of myocarditis post insect sting who is here for gross hematuria and proteinuria.    The patient was in his usual state of health until he has head cold and woke up in the morning with dark urine. He hen developed fever and urine become more red. He has no pain when urinate.  Patient went to see his primary care on 11/27/23 and his UA did show large blood and urine RBC more than 100.  UPCR was 1.8 g/g.  Cr 0.96 with eGFR >90. CT abdomen and pelvis with contrast on 11/27/23 showed normal kidney and bladder.  He was then referred to urology on 11/30/2023.  Who ordered CT urogram which was done on 12/12/2020 which she will normal kidney and bladder, no mass stone or obstruction.  Urology plan for cystoscopy which was scheduled for tomorrow.    Today, he reports that he feels good and the urine color has returned to normal.  He reported that the urine was clear after 1 week of onset.  He also noticed some foamy urine but this is also improved as well.  His dad has history of Buerger's disease and he has hematuria when he got sick.  The onset was an 9-year-old and he does not follow with a kidney doctor currently.  Unclear what is the kidney function at this time.  No other family member has kidney disease.  His dad is currently 58 years  old.    Previously, the patient got bit by an insect and developed myocarditis.  He was prescribed lisinopril but since he recovered, lisinopril was discontinued.  Back then he has no side effects with lisinopril.  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    Today, he feels good. Urine started to clear up after 1 week.  He denies any rashes or joint pain.  He had lobar back pain on the left side that radiated to his left leg and he also has numbness on his left toe.  His appetite was down a little bit but now back to normal.  He has lost about 12 pounds.  He denies any nausea vomiting or diarrhea.  He does not smoke or drink.  He works in welding business. He has UA done on 11/27/2023 which showed protein >=300 mg/dl, large blood and RBC more than 100. UPCR 1.83 mg/g. Cr was 0.96 and CRP 79.90. Some serologies was done which showed C3 125, C4 29, ANCA negative, LUZMARIA negative, chlamydia gonorrhea negative.  COVID, influenza, hepatitis C and HIV are negative.  Strep screen, syphilis are negative.  Urine culture negative.    Labs today show renal panel is pending.  UA showed large blood in the urine, RBC 37 cells and UPCR 0.41 g/g. US renal is pending.     PAST MEDICAL HISTORY:  Reviewed with patient on 12/14/2023     No past medical history on file.    No past surgical history on file.     MEDICATIONS:  PTA Meds  Current Outpatient Medications   Medication    lisinopril (ZESTRIL) 2.5 MG tablet     No current facility-administered medications for this visit.         ALLERGIES:    No Known Allergies    REVIEW OF SYSTEMS:  A comprehensive of systems was negative except as noted above.    SOCIAL HISTORY:   Social History     Socioeconomic History    Marital status: Single     Spouse name: Not on file    Number of children: Not on file    Years of education: Not on file    Highest education level: Not on file   Occupational History    Not on file   Tobacco Use    Smoking status:  Never    Smokeless tobacco: Never   Vaping Use    Vaping Use: Never used   Substance and Sexual Activity    Alcohol use: Not on file    Drug use: Not on file    Sexual activity: Not on file   Other Topics Concern    Not on file   Social History Narrative    Not on file     Social Determinants of Health     Financial Resource Strain: Not on file   Food Insecurity: Not on file   Transportation Needs: Not on file   Physical Activity: Not on file   Stress: Not on file   Social Connections: Not on file   Interpersonal Safety: Not on file   Housing Stability: Not on file     Reviewed with patient.    FAMILY MEDICAL HISTORY:   No family history on file.  Reviewed with patient.  Dad has Tejada's disease    PHYSICAL EXAM:   /73 (BP Location: Right arm, Patient Position: Sitting, Cuff Size: Adult Large)   Pulse 65   Wt 81.2 kg (179 lb)   SpO2 97%   BMI 24.11 kg/m     [unfilled]        GENERAL APPEARANCE: no distress,  awake  EYES: No scleral icterus, pupils equal  Endo: no goiter, no moon facies  Lymphatics: no cervical or supraclavicular LAD  Pulmonary: lungs clear to auscultation with equal breath sounds bilaterally, no clubbing  CV: regular rhythm, normal rate, no rub   - Edema: none  GI: soft, nontender, normal bowel sounds  MS: no evidence of inflammation in joints, no muscle tenderness  : No CVA tenderness.  SKIN: no rash, warm, dry, no cyanosis  NEURO: face symmetric, no asterixis     LABS:   Last Renal Panel:  Sodium   Date Value Ref Range Status   12/14/2023 141 135 - 145 mmol/L Final     Comment:     Reference intervals for this test were updated on 09/26/2023 to more accurately reflect our healthy population. There may be differences in the flagging of prior results with similar values performed with this method. Interpretation of those prior results can be made in the context of the updated reference intervals.      Potassium   Date Value Ref Range Status   12/14/2023 4.5 3.4 - 5.3 mmol/L Final      Chloride   Date Value Ref Range Status   12/14/2023 104 98 - 107 mmol/L Final     Carbon Dioxide (CO2)   Date Value Ref Range Status   12/14/2023 25 22 - 29 mmol/L Final     Anion Gap   Date Value Ref Range Status   12/14/2023 12 7 - 15 mmol/L Final     Glucose   Date Value Ref Range Status   12/14/2023 93 70 - 99 mg/dL Final     Urea Nitrogen   Date Value Ref Range Status   12/14/2023 15.4 6.0 - 20.0 mg/dL Final     Creatinine   Date Value Ref Range Status   12/14/2023 0.86 0.67 - 1.17 mg/dL Final     GFR Estimate   Date Value Ref Range Status   12/14/2023 >90 >60 mL/min/1.73m2 Final     Calcium   Date Value Ref Range Status   12/14/2023 9.8 8.6 - 10.0 mg/dL Final     Phosphorus   Date Value Ref Range Status   12/14/2023 3.3 2.5 - 4.5 mg/dL Final     Albumin   Date Value Ref Range Status   12/14/2023 4.4 3.5 - 5.2 g/dL Final       URINE STUDIES  Recent Labs   Lab Test 12/14/23  0919 11/30/23  0852 11/27/23  1256   COLOR Light Yellow Yellow Brown*   APPEARANCE Clear Slightly Cloudy* Slightly Cloudy*   URINEGLC Negative Negative Negative   URINEBILI Negative Negative Negative   URINEKETONE Negative Negative Negative   SG 1.013 1.015 1.015   UBLD Large* Large* Large*   URINEPH 6.5 6.5 7.0   PROTEIN 20* >=300* >=300*   UROBILINOGEN  --  0.2 1.0   NITRITE Negative Negative Negative   LEUKEST Negative Negative Negative   RBCU 37* 25-50* >100*   WBCU 1 0-5 0-5     No lab results found.  PTH  No lab results found.  IRON STUDIES  No lab results found.    IMAGING:  I review the CT urogram on 12/12/23: Kidney and bladder were normal.    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  His kidney function remained normal at 0.96  mg/dL but UA show RBC more than 100 and UPCR 1.8 g/g.  He has no nephrotic syndrome given normal serum albumin and no swelling.  Prior serological workup to include LUZMARIA, ANCA, chlamydia, gonorrhea, COVID, influenza, hepatitis C, HIV, strep screen, syphilis were all negative.  Urine culture is negative.  CT urogram is unremarkable.  Moreover, the patient also has family history of IgA nephropathy in his dad, known biopsy confirmed but clinically suspicious.  Currently, his hematuria has improved significantly as well as urine protein.  UPCR is down to 0.4 g/g.  Creatinine today is pending but I suspect this is going to be improved.  Based on all clinical presentation, this is most likely IgA nephropathy that is present concurrently with infection.  So far serological workup has been negative.  But I will check H BMP to make sure he does not have hepatitis B infection but my suspicions remain low.  I have low suspicion for urological cause and I think we can forgo cystoscopy for now and if he has recurrent hematuria we can reconsider it again in the future.    At this time given significant improvement in proteinuria and hematuria, I do not think that we need to proceed with a kidney biopsy.  But I will start him on low-dose lisinopril for proteinuria and follow him closely in 3 months.  If his kidney function, proteinuria or hematuria worsen we will proceed with a kidney biopsy.  This has been discussed with the patient's and his mother who are in agreement.    - Hold off on cystoscopy for now, reach out to Dr. Timmons today  - Start him on low-dose lisinopril 2.5 mg daily and asked him to monitor side effects and blood pressure he will let me know if he started to have coughing  - Follow-up in 3 months with labs CBC, renal panel, UA, urine protein, CRP   - Awaiting labs today    Follow-up in 3 months with labs    I spent 75  minutes on the date of the encounter doing chart review, history and exam, documentation  and further activities as noted above. 45 minutes of this visit is dedicated to direct patient interaction via   face-to-face.    Young Holland MD on 12/14/2023              Again, thank you for allowing me to participate in the care of your patient.      Sincerely,    Young Holland MD

## 2023-12-14 NOTE — NURSING NOTE
Chief Complaint   Patient presents with    Consult     Complete RM, ok'd for 12/14/23 appt per RN // Dx: Gross hematuria [R31.0]  Other proteinuria [R80.8] // referred by Paloma Brown APRN CNP in [facility]        /73 (BP Location: Right arm, Patient Position: Sitting, Cuff Size: Adult Large)   Pulse 65   Wt 81.2 kg (179 lb)   SpO2 97%   BMI 24.11 kg/m    Paloma Benitez [facility]

## 2023-12-14 NOTE — PROGRESS NOTES
Nephrology Initial Consult  December 14, 2023      Mayco Castro MRN:[id number] YOB: 2005  Primary care provider: [facility]  Requesting physician: Paloma Brown APRN CNP     REASON FOR CONSULT: Gross hematuria and proteinuria    HISTORY OF PRESENT ILLNESS:  Mayco Castro is a 18 year old with prior Hx of myocarditis post insect sting who is here for gross hematuria and proteinuria.    The patient was in his usual state of health until he has head cold and woke up in the morning with dark urine. He hen developed fever and urine become more red. He has no pain when urinate.  Patient went to see his primary care on 11/27/23 and his UA did show large blood and urine RBC more than 100.  UPCR was 1.8 g/g.  Cr 0.96 with eGFR >90. CT abdomen and pelvis with contrast on 11/27/23 showed normal kidney and bladder.  He was then referred to urology on 11/30/2023.  Who ordered CT urogram which was done on 12/12/2020 which she will normal kidney and bladder, no mass stone or obstruction.  Urology plan for cystoscopy which was scheduled for tomorrow.    Today, he reports that he feels good and the urine color has returned to normal.  He reported that the urine was clear after 1 week of onset.  He also noticed some foamy urine but this is also improved as well.  His dad has history of Buerger's disease and he has hematuria when he got sick.  The onset was an 9-year-old and he does not follow with a kidney doctor currently.  Unclear what is the kidney function at this time.  No other family member has kidney disease.  His dad is currently 58 years old.    Previously, the patient got bit by an insect and developed myocarditis.  He was prescribed lisinopril but since he recovered, lisinopril was discontinued.  Back then he has no side effects with lisinopril.  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    Today, he feels good.  Urine started to clear up after 1 week.  He denies any rashes or joint pain.  He had lobar back pain on the left side that radiated to his left leg and he also has numbness on his left toe.  His appetite was down a little bit but now back to normal.  He has lost about 12 pounds.  He denies any nausea vomiting or diarrhea.  He does not smoke or drink.  He works in welding business. He has UA done on 11/27/2023 which showed protein >=300 mg/dl, large blood and RBC more than 100. UPCR 1.83 mg/g. Cr was 0.96 and CRP 79.90. Some serologies was done which showed C3 125, C4 29, ANCA negative, LUZMARIA negative, chlamydia gonorrhea negative.  COVID, influenza, hepatitis C and HIV are negative.  Strep screen, syphilis are negative.  Urine culture negative.    Labs today show renal panel is pending.  UA showed large blood in the urine, RBC 37 cells and UPCR 0.41 g/g. US renal is pending.     PAST MEDICAL HISTORY:  Reviewed with patient on 12/14/2023     No past medical history on file.    No past surgical history on file.     MEDICATIONS:  PTA Meds  Current Outpatient Medications   Medication    lisinopril (ZESTRIL) 2.5 MG tablet     No current facility-administered medications for this visit.         ALLERGIES:    No Known Allergies    REVIEW OF SYSTEMS:  A comprehensive of systems was negative except as noted above.    SOCIAL HISTORY:   Social History     Socioeconomic History    Marital status: Single     Spouse name: Not on file    Number of children: Not on file    Years of education: Not on file    Highest education level: Not on file   Occupational History    Not on file   Tobacco Use    Smoking status: Never    Smokeless tobacco: Never   Vaping Use    Vaping Use: Never used   Substance and Sexual Activity    Alcohol use: Not on file    Drug use: Not on file    Sexual activity: Not on file   Other Topics Concern    Not on file   Social History Narrative    Not on file     Social Determinants of Health     Financial Resource  Strain: Not on file   Food Insecurity: Not on file   Transportation Needs: Not on file   Physical Activity: Not on file   Stress: Not on file   Social Connections: Not on file   Interpersonal Safety: Not on file   Housing Stability: Not on file     Reviewed with patient.    FAMILY MEDICAL HISTORY:   No family history on file.  Reviewed with patient.  Dad has Tejada's disease    PHYSICAL EXAM:   /73 (BP Location: Right arm, Patient Position: Sitting, Cuff Size: Adult Large)   Pulse 65   Wt 81.2 kg (179 lb)   SpO2 97%   BMI 24.11 kg/m     [unfilled]        GENERAL APPEARANCE: no distress,  awake  EYES: No scleral icterus, pupils equal  Endo: no goiter, no moon facies  Lymphatics: no cervical or supraclavicular LAD  Pulmonary: lungs clear to auscultation with equal breath sounds bilaterally, no clubbing  CV: regular rhythm, normal rate, no rub   - Edema: none  GI: soft, nontender, normal bowel sounds  MS: no evidence of inflammation in joints, no muscle tenderness  : No CVA tenderness.  SKIN: no rash, warm, dry, no cyanosis  NEURO: face symmetric, no asterixis     LABS:   Last Renal Panel:  Sodium   Date Value Ref Range Status   12/14/2023 141 135 - 145 mmol/L Final     Comment:     Reference intervals for this test were updated on 09/26/2023 to more accurately reflect our healthy population. There may be differences in the flagging of prior results with similar values performed with this method. Interpretation of those prior results can be made in the context of the updated reference intervals.      Potassium   Date Value Ref Range Status   12/14/2023 4.5 3.4 - 5.3 mmol/L Final     Chloride   Date Value Ref Range Status   12/14/2023 104 98 - 107 mmol/L Final     Carbon Dioxide (CO2)   Date Value Ref Range Status   12/14/2023 25 22 - 29 mmol/L Final     Anion Gap   Date Value Ref Range Status   12/14/2023 12 7 - 15 mmol/L Final     Glucose   Date Value Ref Range Status   12/14/2023 93 70 - 99 mg/dL Final      Urea Nitrogen   Date Value Ref Range Status   12/14/2023 15.4 6.0 - 20.0 mg/dL Final     Creatinine   Date Value Ref Range Status   12/14/2023 0.86 0.67 - 1.17 mg/dL Final     GFR Estimate   Date Value Ref Range Status   12/14/2023 >90 >60 mL/min/1.73m2 Final     Calcium   Date Value Ref Range Status   12/14/2023 9.8 8.6 - 10.0 mg/dL Final     Phosphorus   Date Value Ref Range Status   12/14/2023 3.3 2.5 - 4.5 mg/dL Final     Albumin   Date Value Ref Range Status   12/14/2023 4.4 3.5 - 5.2 g/dL Final       URINE STUDIES  Recent Labs   Lab Test 12/14/23  0919 11/30/23  0852 11/27/23  1256   COLOR Light Yellow Yellow Brown*   APPEARANCE Clear Slightly Cloudy* Slightly Cloudy*   URINEGLC Negative Negative Negative   URINEBILI Negative Negative Negative   URINEKETONE Negative Negative Negative   SG 1.013 1.015 1.015   UBLD Large* Large* Large*   URINEPH 6.5 6.5 7.0   PROTEIN 20* >=300* >=300*   UROBILINOGEN  --  0.2 1.0   NITRITE Negative Negative Negative   LEUKEST Negative Negative Negative   RBCU 37* 25-50* >100*   WBCU 1 0-5 0-5     No lab results found.  PTH  No lab results found.  IRON STUDIES  No lab results found.    IMAGING:  I review the CT urogram on 12/12/23: Kidney and bladder were normal.    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  His kidney function remained normal at 0.96 mg/dL but UA show RBC more than 100 and UPCR 1.8 g/g.  He has no nephrotic syndrome given normal serum albumin and no swelling.  Prior serological workup to include LUZMARIA, ANCA, chlamydia, gonorrhea, COVID, influenza, hepatitis C, HIV, strep screen, syphilis were all negative.  Urine culture is negative.  CT urogram is unremarkable.   Moreover, the patient also has family history of IgA nephropathy in his dad, known biopsy confirmed but clinically suspicious.  Currently, his hematuria has improved significantly as well as urine protein.  UPCR is down to 0.4 g/g.  Creatinine today is pending but I suspect this is going to be improved.  Based on all clinical presentation, this is most likely IgA nephropathy that is present concurrently with infection.  So far serological workup has been negative.  But I will check H BMP to make sure he does not have hepatitis B infection but my suspicions remain low.  I have low suspicion for urological cause and I think we can forgo cystoscopy for now and if he has recurrent hematuria we can reconsider it again in the future.    At this time given significant improvement in proteinuria and hematuria, I do not think that we need to proceed with a kidney biopsy.  But I will start him on low-dose lisinopril for proteinuria and follow him closely in 3 months.  If his kidney function, proteinuria or hematuria worsen we will proceed with a kidney biopsy.  This has been discussed with the patient's and his mother who are in agreement.    - Hold off on cystoscopy for now, reach out to Dr. Timmons today  - Start him on low-dose lisinopril 2.5 mg daily and asked him to monitor side effects and blood pressure he will let me know if he started to have coughing  - Follow-up in 3 months with labs CBC, renal panel, UA, urine protein, CRP   - Awaiting labs today    Follow-up in 3 months with labs    I spent 75  minutes on the date of the encounter doing chart review, history and exam, documentation and further activities as noted above. 45 minutes of this visit is dedicated to direct patient interaction via   face-to-face.    Young Holland MD on 12/14/2023

## 2023-12-15 LAB
ANA SER QL IF: NEGATIVE
ASO AB SERPL-ACNC: 244 IU/ML
C3 SERPL-MCNC: 104 MG/DL (ref 81–157)
C4 SERPL-MCNC: 15 MG/DL (ref 13–39)
DSDNA AB SER-ACNC: 1.3 IU/ML
HBV SURFACE AG SERPL QL IA: NONREACTIVE
STREP DNASE B SER-ACNC: 111 U/ML

## 2023-12-31 ENCOUNTER — HEALTH MAINTENANCE LETTER (OUTPATIENT)
Age: 18
End: 2023-12-31

## 2024-03-18 ENCOUNTER — LAB (OUTPATIENT)
Dept: LAB | Facility: CLINIC | Age: 19
End: 2024-03-18
Payer: COMMERCIAL

## 2024-03-18 ENCOUNTER — OFFICE VISIT (OUTPATIENT)
Dept: NEPHROLOGY | Facility: CLINIC | Age: 19
End: 2024-03-18
Attending: INTERNAL MEDICINE
Payer: COMMERCIAL

## 2024-03-18 VITALS
HEART RATE: 69 BPM | SYSTOLIC BLOOD PRESSURE: 123 MMHG | BODY MASS INDEX: 24.9 KG/M2 | OXYGEN SATURATION: 100 % | DIASTOLIC BLOOD PRESSURE: 63 MMHG | TEMPERATURE: 98.5 F | WEIGHT: 184.9 LBS

## 2024-03-18 DIAGNOSIS — R31.0 GROSS HEMATURIA: ICD-10-CM

## 2024-03-18 DIAGNOSIS — N02.B9 IGA NEPHROPATHY: ICD-10-CM

## 2024-03-18 DIAGNOSIS — N02.B9 IGA NEPHROPATHY: Primary | ICD-10-CM

## 2024-03-18 DIAGNOSIS — R80.1 PERSISTENT PROTEINURIA: ICD-10-CM

## 2024-03-18 LAB
ALBUMIN MFR UR ELPH: 52.5 MG/DL
ALBUMIN SERPL BCG-MCNC: 4.4 G/DL (ref 3.5–5.2)
ALBUMIN UR-MCNC: 50 MG/DL
ANION GAP SERPL CALCULATED.3IONS-SCNC: 10 MMOL/L (ref 7–15)
APPEARANCE UR: CLEAR
BASOPHILS # BLD AUTO: 0 10E3/UL (ref 0–0.2)
BASOPHILS NFR BLD AUTO: 0 %
BILIRUB UR QL STRIP: NEGATIVE
BUN SERPL-MCNC: 16.6 MG/DL (ref 6–20)
CALCIUM SERPL-MCNC: 9.6 MG/DL (ref 8.6–10)
CHLORIDE SERPL-SCNC: 103 MMOL/L (ref 98–107)
COLOR UR AUTO: ABNORMAL
CREAT SERPL-MCNC: 0.92 MG/DL (ref 0.67–1.17)
CREAT UR-MCNC: 76.4 MG/DL
CRP SERPL-MCNC: <3 MG/L
DEPRECATED HCO3 PLAS-SCNC: 26 MMOL/L (ref 22–29)
EGFRCR SERPLBLD CKD-EPI 2021: >90 ML/MIN/1.73M2
EOSINOPHIL # BLD AUTO: 0.1 10E3/UL (ref 0–0.7)
EOSINOPHIL NFR BLD AUTO: 2 %
ERYTHROCYTE [DISTWIDTH] IN BLOOD BY AUTOMATED COUNT: 12.5 % (ref 10–15)
GLUCOSE SERPL-MCNC: 72 MG/DL (ref 70–99)
GLUCOSE UR STRIP-MCNC: NEGATIVE MG/DL
HCT VFR BLD AUTO: 42.8 % (ref 40–53)
HGB BLD-MCNC: 15.3 G/DL (ref 13.3–17.7)
HGB UR QL STRIP: ABNORMAL
IMM GRANULOCYTES # BLD: 0 10E3/UL
IMM GRANULOCYTES NFR BLD: 0 %
KETONES UR STRIP-MCNC: NEGATIVE MG/DL
LEUKOCYTE ESTERASE UR QL STRIP: NEGATIVE
LYMPHOCYTES # BLD AUTO: 2.4 10E3/UL (ref 0.8–5.3)
LYMPHOCYTES NFR BLD AUTO: 31 %
MCH RBC QN AUTO: 29.9 PG (ref 26.5–33)
MCHC RBC AUTO-ENTMCNC: 35.7 G/DL (ref 31.5–36.5)
MCV RBC AUTO: 84 FL (ref 78–100)
MONOCYTES # BLD AUTO: 0.7 10E3/UL (ref 0–1.3)
MONOCYTES NFR BLD AUTO: 10 %
MUCOUS THREADS #/AREA URNS LPF: PRESENT /LPF
NEUTROPHILS # BLD AUTO: 4.4 10E3/UL (ref 1.6–8.3)
NEUTROPHILS NFR BLD AUTO: 57 %
NITRATE UR QL: NEGATIVE
NRBC # BLD AUTO: 0 10E3/UL
NRBC BLD AUTO-RTO: 0 /100
PH UR STRIP: 7 [PH] (ref 5–7)
PHOSPHATE SERPL-MCNC: 3.5 MG/DL (ref 2.5–4.5)
PLATELET # BLD AUTO: 223 10E3/UL (ref 150–450)
POTASSIUM SERPL-SCNC: 4 MMOL/L (ref 3.4–5.3)
PROT/CREAT 24H UR: 0.69 MG/MG CR (ref 0–0.2)
RBC # BLD AUTO: 5.11 10E6/UL (ref 4.4–5.9)
RBC URINE: 68 /HPF
SODIUM SERPL-SCNC: 139 MMOL/L (ref 135–145)
SP GR UR STRIP: 1.01 (ref 1–1.03)
UROBILINOGEN UR STRIP-MCNC: NORMAL MG/DL
WBC # BLD AUTO: 7.7 10E3/UL (ref 4–11)
WBC URINE: 1 /HPF

## 2024-03-18 PROCEDURE — 81001 URINALYSIS AUTO W/SCOPE: CPT | Performed by: PATHOLOGY

## 2024-03-18 PROCEDURE — 99214 OFFICE O/P EST MOD 30 MIN: CPT | Performed by: INTERNAL MEDICINE

## 2024-03-18 PROCEDURE — 86140 C-REACTIVE PROTEIN: CPT | Performed by: PATHOLOGY

## 2024-03-18 PROCEDURE — G2211 COMPLEX E/M VISIT ADD ON: HCPCS | Performed by: INTERNAL MEDICINE

## 2024-03-18 PROCEDURE — 85025 COMPLETE CBC W/AUTO DIFF WBC: CPT | Performed by: PATHOLOGY

## 2024-03-18 PROCEDURE — 84156 ASSAY OF PROTEIN URINE: CPT | Performed by: PATHOLOGY

## 2024-03-18 PROCEDURE — 99215 OFFICE O/P EST HI 40 MIN: CPT | Performed by: INTERNAL MEDICINE

## 2024-03-18 PROCEDURE — 80069 RENAL FUNCTION PANEL: CPT | Performed by: PATHOLOGY

## 2024-03-18 PROCEDURE — 36415 COLL VENOUS BLD VENIPUNCTURE: CPT | Performed by: PATHOLOGY

## 2024-03-18 RX ORDER — LOSARTAN POTASSIUM 25 MG/1
12.5 TABLET ORAL DAILY
Qty: 15 TABLET | Refills: 6 | Status: SHIPPED | OUTPATIENT
Start: 2024-03-18 | End: 2024-07-01

## 2024-03-18 ASSESSMENT — PAIN SCALES - GENERAL: PAINLEVEL: NO PAIN (0)

## 2024-03-18 NOTE — PROGRESS NOTES
Nephrology Progress Note  03/18/2024   Chief complaint: Follow up for presumed IgAN  History of Present Illness:    Mayco Castro is a 18 year old with prior Hx of myocarditis post insect sting who is here for gross hematuria and proteinuria.     The patient was in his usual state of health until he has head cold and woke up in the morning with dark urine. He hen developed fever and urine become more red. He has no pain when urinate.  Patient went to see his primary care on 11/27/23 and his UA did show large blood and urine RBC more than 100.  UPCR was 1.8 g/g.  Cr 0.96 with eGFR >90. CT abdomen and pelvis with contrast on 11/27/23 showed normal kidney and bladder.  He was then referred to urology on 11/30/2023.  Who ordered CT urogram which was done on 12/12/2020 which she will normal kidney and bladder, no mass stone or obstruction.  Urology plan for cystoscopy which was scheduled for tomorrow.     Today, he reports that he feels good and the urine color has returned to normal.  He reported that the urine was clear after 1 week of onset.  He also noticed some foamy urine but this is also improved as well.  His dad has history of Buerger's disease and he has hematuria when he got sick.  The onset was an 9-year-old and he does not follow with a kidney doctor currently.  Unclear what is the kidney function at this time.  No other family member has kidney disease.  His dad is currently 58 years old.     Previously, the patient got bit by an insect and developed myocarditis.  He was prescribed lisinopril but since he recovered, lisinopril was discontinued.  Back then he has no side effects with lisinopril.  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     12/14/13: He feels good. Urine started to clear up after 1 week.  He denies any rashes or joint pain.  He had lobar back pain on the left side that radiated to his left leg and he also has numbness on his left toe.  His  appetite was down a little bit but now back to normal.  He has lost about 12 pounds.  He denies any nausea vomiting or diarrhea.  He does not smoke or drink.  He works in welding business. He has UA done on 11/27/2023 which showed protein >=300 mg/dl, large blood and RBC more than 100. UPCR 1.83 mg/g. Cr was 0.96 and CRP 79.90. Some serologies was done which showed C3 125, C4 29, ANCA negative, LUZMARIA negative, chlamydia gonorrhea negative.  COVID, influenza, hepatitis C and HIV are negative.  Strep screen, syphilis are negative.  Urine culture negative.     Labs today show renal panel is pending.  UA showed large blood in the urine, RBC 37 cells and UPCR 0.41 g/g. US renal     3/18/24: He has been feeling good.  He still has foamy in the urine.  Patient stopped taking lisinopril as he does not feel good after taking the meds.  He felt that something wrong with in his chest.  However he does not measure blood pressure to see whether he developed hypotension or not.  He typically takes the meds in the evening but it still does not help with the symptoms.  Patient recently visited [place] and had some head colds.  At that time he had gross hematuria for couple days and spontaneously resolved. Labs today show creatinine 0.92, BUN 16, EGFR more than 90, potassium 4,carbon dioxide 26, CRP less than 3.  Hemoglobin 15.3, platelet 223.  UA showed large blood with RBC 68. UPCR 0.69 g/g.     Past medical history  No past medical history on file.    Past surgical history  No past surgical history on file.    Review of Systems:   14 systems were reviewed and all negative except as mentioned above.   Current Medications:  Current Outpatient Medications   Medication    lisinopril (ZESTRIL) 2.5 MG tablet     No current facility-administered medications for this visit.       Physical Exam:   /63   Pulse 69   Temp 98.5  F (36.9  C) (Oral)   Wt 83.9 kg (184 lb 14.4 oz)   SpO2 100%   BMI 24.90 kg/m     Body mass index is 24.9  kg/m .    GENERAL APPEARANCE: Alert, not in acute distress  EYES:  Not pale conjunctiva, pupils equal  HENT: Mouth without ulcers or lesions  PULM: lungs clear to auscultation bilaterally, equal air movement, no clubbing  CV: regular rhythm, normal rate, no rub     -JVD no distended.      -edema: none  GI: soft,  - tender, no distended, bowel sounds are present  INTEGUMENT: No rash  NEURO:  Non focal. No asterixis.     Labs:   All labs reviewed by me  Last Renal Panel:  Sodium   Date Value Ref Range Status   12/14/2023 141 135 - 145 mmol/L Final     Comment:     Reference intervals for this test were updated on 09/26/2023 to more accurately reflect our healthy population. There may be differences in the flagging of prior results with similar values performed with this method. Interpretation of those prior results can be made in the context of the updated reference intervals.      Potassium   Date Value Ref Range Status   12/14/2023 4.5 3.4 - 5.3 mmol/L Final     Chloride   Date Value Ref Range Status   12/14/2023 104 98 - 107 mmol/L Final     Carbon Dioxide (CO2)   Date Value Ref Range Status   12/14/2023 25 22 - 29 mmol/L Final     Anion Gap   Date Value Ref Range Status   12/14/2023 12 7 - 15 mmol/L Final     Glucose   Date Value Ref Range Status   12/14/2023 93 70 - 99 mg/dL Final     Urea Nitrogen   Date Value Ref Range Status   12/14/2023 15.4 6.0 - 20.0 mg/dL Final     Creatinine   Date Value Ref Range Status   12/14/2023 0.86 0.67 - 1.17 mg/dL Final     GFR Estimate   Date Value Ref Range Status   12/14/2023 >90 >60 mL/min/1.73m2 Final     Calcium   Date Value Ref Range Status   12/14/2023 9.8 8.6 - 10.0 mg/dL Final     Phosphorus   Date Value Ref Range Status   12/14/2023 3.3 2.5 - 4.5 mg/dL Final     Albumin   Date Value Ref Range Status   12/14/2023 4.4 3.5 - 5.2 g/dL Final       Imaging:  I reviewed imaging studies.   US renal on 12/14/23:    HISTORY: Proteinuria/hematuria     TECHNIQUE: The kidneys and  bladder were scanned in the standard  fashion with specialized ultrasound transducer(s) using both gray  scale and limited color/spectral Doppler techniques.     FINDINGS:     Right kidney: Measures 11.1 cm in length. Parenchyma is of normal  thickness and echogenicity. No focal mass. No hydronephrosis.     Left kidney: Measures 9.3 cm in length. Parenchyma is of normal  thickness and echogenicity. No focal mass. No hydronephrosis.      Bladder: Unremarkable.                                                                      IMPRESSION:  Normal renal ultrasound.      I have personally reviewed the examination and initial interpretation  and I agree with the findings.  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 His kidney function remained normal at 0.96 mg/dL but UA show RBC more than 100 and UPCR 1.8 g/g in November 2023.  He has no nephrotic syndrome given normal serum albumin and no swelling. Serological workup to include LUZMARIA, ANCA, chlamydia, gonorrhea, COVID, influenza, hepatitis C, HIV, strep screen, syphilis were all negative.  Urine culture is negative.  CT urogram is unremarkable.  Moreover, the patient also has family history of IgA nephropathy in his dad, known biopsy confirmed when he was 9 years old.  I initially saw the patient on 12/14/2020 and at that time he has been 0.86/0.4 g and urine RBC 37.  I started him on lisinopril 2.5 mg/day but he could not tolerated due to chest symptoms.  he comes back for follow-up and he still reports some intermittent gross hematuria associated with the infection.  UA today still show RBC of 68 cells per high-power field.  Creatinine of 0.92 and UPCR had  increased to 0.69 g/g.  Serum albumin is normal is 4.4.  Given that this is highly suspicion for IgA nephropathy, no biopsy would not  at this time given the urine protein is still less than 1 g/g.  So I will try to continue with conservative management at this time but will switch lisinopril to losartan 12.5 mg daily.  If he clinically does not improve or has worsening urine protein or red blood cell in 3 months we will proceed with a kidney biopsy.      - Switch him from lisinopril 2.5 mg daily to losartan 12.5 mg daily  - Asked him to monitor blood pressure and how he feels; if he does not feel good then he should monitor his blood pressure ensure that he has no hypertension  -  Follow-up in 3 months with labs CBC, renal panel, UA, urine protein, CRP     Follow-up in 3 months with labs    The longitudinal plan of care for IgAN, gross hematuria, proteinuria was addressed during this visit. Due to the added complexity in care, I will continue to support Mayco Sanchez the subsequent management of this condition(s) and with the ongoing continuity of care of this condition(s).    I spent  30 minutes on the date of the encounter doing chart review, history and exam, documentation and further activities as noted above. 25 minutes of this visit is dedicated to direct patient interaction via face-to-face.    Young Holland MD on 03/18/2024

## 2024-03-18 NOTE — PATIENT INSTRUCTIONS
"Start Losartan 12.5 mg daily  If \"not feeling well\" please let me know and also measure your blood pressure  See you in 3 months with labs  Things that you should look for: persistent dark urine, more foamy urine, leg swelling  "

## 2024-03-18 NOTE — LETTER
3/18/2024       RE: Mayco Castro  [address] [address]     Dear Colleague,    Thank you for referring your patient, Mayco Castro, to the [facility] [place] at [facility]. Please see a copy of my visit note below.      Nephrology Progress Note  03/18/2024   Chief complaint: Follow up for presumed IgAN  History of Present Illness:    Mayco Castro is a 18 year old with prior Hx of myocarditis post insect sting who is here for gross hematuria and proteinuria.     The patient was in his usual state of health until he has head cold and woke up in the morning with dark urine. He hen developed fever and urine become more red. He has no pain when urinate.  Patient went to see his primary care on 11/27/23 and his UA did show large blood and urine RBC more than 100.  UPCR was 1.8 g/g.  Cr 0.96 with eGFR >90. CT abdomen and pelvis with contrast on 11/27/23 showed normal kidney and bladder.  He was then referred to urology on 11/30/2023.  Who ordered CT urogram which was done on 12/12/2020 which she will normal kidney and bladder, no mass stone or obstruction.  Urology plan for cystoscopy which was scheduled for tomorrow.     Today, he reports that he feels good and the urine color has returned to normal.  He reported that the urine was clear after 1 week of onset.  He also noticed some foamy urine but this is also improved as well.  His dad has history of Buerger's disease and he has hematuria when he got sick.  The onset was an 9-year-old and he does not follow with a kidney doctor currently.  Unclear what is the kidney function at this time.  No other family member has kidney disease.  His dad is currently 58 years old.     Previously, the patient got bit by an insect and developed myocarditis.  He was prescribed lisinopril but since he recovered, lisinopril was discontinued.  Back then he has no side effects with lisinopril.  He  also had history of this herniation and underwent surgery when he was 15 years old.     12/14/13: He feels good. Urine started to clear up after 1 week.  He denies any rashes or joint pain.  He had lobar back pain on the left side that radiated to his left leg and he also has numbness on his left toe.  His appetite was down a little bit but now back to normal.  He has lost about 12 pounds.  He denies any nausea vomiting or diarrhea.  He does not smoke or drink.  He works in welding business. He has UA done on 11/27/2023 which showed protein >=300 mg/dl, large blood and RBC more than 100. UPCR 1.83 mg/g. Cr was 0.96 and CRP 79.90. Some serologies was done which showed C3 125, C4 29, ANCA negative, LUZMARIA negative, chlamydia gonorrhea negative.  COVID, influenza, hepatitis C and HIV are negative.  Strep screen, syphilis are negative.  Urine culture negative.     Labs today show renal panel is pending.  UA showed large blood in the urine, RBC 37 cells and UPCR 0.41 g/g. US renal     3/18/24: He has been feeling good.  He still has foamy in the urine.  Patient stopped taking lisinopril as he does not feel good after taking the meds.  He felt that something wrong with in his chest.  However he does not measure blood pressure to see whether he developed hypotension or not.  He typically takes the meds in the evening but it still does not help with the symptoms.  Patient recently visited [place] and had some head colds.  At that time he had gross hematuria for couple days and spontaneously resolved. Labs today show creatinine 0.92, BUN 16, EGFR more than 90, potassium 4,carbon dioxide 26, CRP less than 3.  Hemoglobin 15.3, platelet 223.  UA showed large blood with RBC 68. UPCR 0.69 g/g.     Past medical history  No past medical history on file.    Past surgical history  No past surgical history on file.    Review of Systems:   14 systems were reviewed and all negative except as mentioned above.   Current Medications:  Current  Outpatient Medications   Medication    lisinopril (ZESTRIL) 2.5 MG tablet     No current facility-administered medications for this visit.       Physical Exam:   /63   Pulse 69   Temp 98.5  F (36.9  C) (Oral)   Wt 83.9 kg (184 lb 14.4 oz)   SpO2 100%   BMI 24.90 kg/m     Body mass index is 24.9 kg/m .    GENERAL APPEARANCE: Alert, not in acute distress  EYES:  Not pale conjunctiva, pupils equal  HENT: Mouth without ulcers or lesions  PULM: lungs clear to auscultation bilaterally, equal air movement, no clubbing  CV: regular rhythm, normal rate, no rub     -JVD no distended.      -edema: none  GI: soft,  - tender, no distended, bowel sounds are present  INTEGUMENT: No rash  NEURO:  Non focal. No asterixis.     Labs:   All labs reviewed by me  Last Renal Panel:  Sodium   Date Value Ref Range Status   12/14/2023 141 135 - 145 mmol/L Final     Comment:     Reference intervals for this test were updated on 09/26/2023 to more accurately reflect our healthy population. There may be differences in the flagging of prior results with similar values performed with this method. Interpretation of those prior results can be made in the context of the updated reference intervals.      Potassium   Date Value Ref Range Status   12/14/2023 4.5 3.4 - 5.3 mmol/L Final     Chloride   Date Value Ref Range Status   12/14/2023 104 98 - 107 mmol/L Final     Carbon Dioxide (CO2)   Date Value Ref Range Status   12/14/2023 25 22 - 29 mmol/L Final     Anion Gap   Date Value Ref Range Status   12/14/2023 12 7 - 15 mmol/L Final     Glucose   Date Value Ref Range Status   12/14/2023 93 70 - 99 mg/dL Final     Urea Nitrogen   Date Value Ref Range Status   12/14/2023 15.4 6.0 - 20.0 mg/dL Final     Creatinine   Date Value Ref Range Status   12/14/2023 0.86 0.67 - 1.17 mg/dL Final     GFR Estimate   Date Value Ref Range Status   12/14/2023 >90 >60 mL/min/1.73m2 Final     Calcium   Date Value Ref Range Status   12/14/2023 9.8 8.6 - 10.0  mg/dL Final     Phosphorus   Date Value Ref Range Status   12/14/2023 3.3 2.5 - 4.5 mg/dL Final     Albumin   Date Value Ref Range Status   12/14/2023 4.4 3.5 - 5.2 g/dL Final       Imaging:  I reviewed imaging studies.   US renal on 12/14/23:    HISTORY: Proteinuria/hematuria     TECHNIQUE: The kidneys and bladder were scanned in the standard  fashion with specialized ultrasound transducer(s) using both gray  scale and limited color/spectral Doppler techniques.     FINDINGS:     Right kidney: Measures 11.1 cm in length. Parenchyma is of normal  thickness and echogenicity. No focal mass. No hydronephrosis.     Left kidney: Measures 9.3 cm in length. Parenchyma is of normal  thickness and echogenicity. No focal mass. No hydronephrosis.      Bladder: Unremarkable.                                                                      IMPRESSION:  Normal renal ultrasound.      I have personally reviewed the examination and initial interpretation  and I agree with the findings.  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 His kidney function remained normal at 0.96 mg/dL but UA show RBC more than 100 and UPCR 1.8 g/g in November 2023.  He has no nephrotic syndrome given normal serum albumin and no swelling. Serological workup to include LUZMARIA, ANCA, chlamydia, gonorrhea, COVID, influenza, hepatitis C, HIV, strep screen, syphilis were all negative.  Urine culture is negative.  CT urogram is unremarkable.  Moreover, the patient also has family history of IgA nephropathy in his dad, known biopsy confirmed when he was 9 years old.  I initially saw the patient on 12/14/2020 and at that time he has been 0.86/0.4 g and urine  RBC 37.  I started him on lisinopril 2.5 mg/day but he could not tolerated due to chest symptoms.  he comes back for follow-up and he still reports some intermittent gross hematuria associated with the infection.  UA today still show RBC of 68 cells per high-power field.  Creatinine of 0.92 and UPCR had increased to 0.69 g/g.  Serum albumin is normal is 4.4.  Given that this is highly suspicion for IgA nephropathy, no biopsy would not  at this time given the urine protein is still less than 1 g/g.  So I will try to continue with conservative management at this time but will switch lisinopril to losartan 12.5 mg daily.  If he clinically does not improve or has worsening urine protein or red blood cell in 3 months we will proceed with a kidney biopsy.      - Switch him from lisinopril 2.5 mg daily to losartan 12.5 mg daily  - Asked him to monitor blood pressure and how he feels; if he does not feel good then he should monitor his blood pressure ensure that he has no hypertension  -  Follow-up in 3 months with labs CBC, renal panel, UA, urine protein, CRP     Follow-up in 3 months with labs    The longitudinal plan of care for IgAN, gross hematuria, proteinuria was addressed during this visit. Due to the added complexity in care, I will continue to support Alissacan Laura the subsequent management of this condition(s) and with the ongoing continuity of care of this condition(s).    I spent  30 minutes on the date of the encounter doing chart review, history and exam, documentation and further activities as noted above. 25 minutes of this visit is dedicated to direct patient interaction via face-to-face.    Young Holland MD on 03/18/2024

## 2024-03-18 NOTE — NURSING NOTE
Chief Complaint   Patient presents with    RECHECK     3 mo f/u       /63   Pulse 69   Temp 98.5  F (36.9  C) (Oral)   Wt 83.9 kg (184 lb 14.4 oz)   SpO2 100%   BMI 24.90 kg/m      Gurjit Berger on 3/18/2024 at 3:48 PM

## 2024-03-20 ENCOUNTER — TELEPHONE (OUTPATIENT)
Dept: NEPHROLOGY | Facility: CLINIC | Age: 19
End: 2024-03-20
Payer: COMMERCIAL

## 2024-03-20 NOTE — TELEPHONE ENCOUNTER
Patient scheduled for:     Appointment type: Return nephrology  Provider: Dr. Holland  Return date: 7/1  Specialty phone number: [phone]  Additional appointment(s) scheduled: Lab scheduled prior to appointment

## 2024-05-08 ENCOUNTER — LAB (OUTPATIENT)
Dept: LAB | Facility: CLINIC | Age: 19
End: 2024-05-08
Payer: COMMERCIAL

## 2024-05-08 ENCOUNTER — HOSPITAL ENCOUNTER (EMERGENCY)
Facility: CLINIC | Age: 19
Discharge: HOME | End: 2024-05-08
Attending: FAMILY MEDICINE | Admitting: FAMILY MEDICINE
Payer: COMMERCIAL

## 2024-05-08 ENCOUNTER — APPOINTMENT (OUTPATIENT)
Dept: CT IMAGING | Facility: CLINIC | Age: 19
End: 2024-05-08
Attending: FAMILY MEDICINE
Payer: COMMERCIAL

## 2024-05-08 ENCOUNTER — TELEPHONE (OUTPATIENT)
Dept: NEPHROLOGY | Facility: CLINIC | Age: 19
End: 2024-05-08

## 2024-05-08 VITALS
DIASTOLIC BLOOD PRESSURE: 80 MMHG | OXYGEN SATURATION: 98 % | HEART RATE: 77 BPM | RESPIRATION RATE: 16 BRPM | TEMPERATURE: 98.3 F | HEIGHT: 73 IN | WEIGHT: 180 LBS | SYSTOLIC BLOOD PRESSURE: 119 MMHG | BODY MASS INDEX: 23.86 KG/M2

## 2024-05-08 DIAGNOSIS — R31.0 GROSS HEMATURIA: ICD-10-CM

## 2024-05-08 DIAGNOSIS — N02.B9 IGA NEPHROPATHY: Primary | ICD-10-CM

## 2024-05-08 DIAGNOSIS — R10.9 BILATERAL FLANK PAIN: ICD-10-CM

## 2024-05-08 DIAGNOSIS — N02.B9 IGA NEPHROPATHY: ICD-10-CM

## 2024-05-08 LAB
ALBUMIN MFR UR ELPH: 133 MG/DL
ALBUMIN SERPL BCG-MCNC: 4.5 G/DL (ref 3.5–5.2)
ALBUMIN UR-MCNC: 100 MG/DL
ANION GAP SERPL CALCULATED.3IONS-SCNC: 10 MMOL/L (ref 7–15)
APPEARANCE UR: ABNORMAL
BILIRUB UR QL STRIP: NEGATIVE
BUN SERPL-MCNC: 15.2 MG/DL (ref 6–20)
CALCIUM SERPL-MCNC: 9.6 MG/DL (ref 8.6–10)
CHLORIDE SERPL-SCNC: 103 MMOL/L (ref 98–107)
COLOR UR AUTO: ABNORMAL
CREAT SERPL-MCNC: 1 MG/DL (ref 0.67–1.17)
CREAT UR-MCNC: 92.5 MG/DL
DEPRECATED HCO3 PLAS-SCNC: 25 MMOL/L (ref 22–29)
EGFRCR SERPLBLD CKD-EPI 2021: >90 ML/MIN/1.73M2
ERYTHROCYTE [DISTWIDTH] IN BLOOD BY AUTOMATED COUNT: 11.9 % (ref 10–15)
GLUCOSE SERPL-MCNC: 92 MG/DL (ref 70–99)
GLUCOSE UR STRIP-MCNC: NEGATIVE MG/DL
HCT VFR BLD AUTO: 43.5 % (ref 40–53)
HGB BLD-MCNC: 15.5 G/DL (ref 13.3–17.7)
HGB UR QL STRIP: ABNORMAL
KETONES UR STRIP-MCNC: NEGATIVE MG/DL
LEUKOCYTE ESTERASE UR QL STRIP: NEGATIVE
MCH RBC QN AUTO: 30 PG (ref 26.5–33)
MCHC RBC AUTO-ENTMCNC: 35.6 G/DL (ref 31.5–36.5)
MCV RBC AUTO: 84 FL (ref 78–100)
NITRATE UR QL: NEGATIVE
PH UR STRIP: 6.5 [PH] (ref 5–7)
PHOSPHATE SERPL-MCNC: 3.7 MG/DL (ref 2.5–4.5)
PLATELET # BLD AUTO: 194 10E3/UL (ref 150–450)
POTASSIUM SERPL-SCNC: 4.4 MMOL/L (ref 3.4–5.3)
PROT/CREAT 24H UR: 1.44 MG/MG CR (ref 0–0.2)
RBC # BLD AUTO: 5.16 10E6/UL (ref 4.4–5.9)
RBC URINE: >182 /HPF
SODIUM SERPL-SCNC: 138 MMOL/L (ref 135–145)
SP GR UR STRIP: 1.01 (ref 1–1.03)
UROBILINOGEN UR STRIP-MCNC: NORMAL MG/DL
WBC # BLD AUTO: 5.9 10E3/UL (ref 4–11)
WBC URINE: 8 /HPF

## 2024-05-08 PROCEDURE — 85027 COMPLETE CBC AUTOMATED: CPT | Performed by: PATHOLOGY

## 2024-05-08 PROCEDURE — 81001 URINALYSIS AUTO W/SCOPE: CPT | Performed by: PATHOLOGY

## 2024-05-08 PROCEDURE — 84156 ASSAY OF PROTEIN URINE: CPT | Performed by: PATHOLOGY

## 2024-05-08 PROCEDURE — 99284 EMERGENCY DEPT VISIT MOD MDM: CPT | Mod: 25 | Performed by: FAMILY MEDICINE

## 2024-05-08 PROCEDURE — 99284 EMERGENCY DEPT VISIT MOD MDM: CPT | Performed by: FAMILY MEDICINE

## 2024-05-08 PROCEDURE — 36415 COLL VENOUS BLD VENIPUNCTURE: CPT | Performed by: PATHOLOGY

## 2024-05-08 PROCEDURE — 80069 RENAL FUNCTION PANEL: CPT | Performed by: PATHOLOGY

## 2024-05-08 PROCEDURE — 74176 CT ABD & PELVIS W/O CONTRAST: CPT

## 2024-05-08 ASSESSMENT — ACTIVITIES OF DAILY LIVING (ADL)
ADLS_ACUITY_SCORE: 35

## 2024-05-08 ASSESSMENT — COLUMBIA-SUICIDE SEVERITY RATING SCALE - C-SSRS
2. HAVE YOU ACTUALLY HAD ANY THOUGHTS OF KILLING YOURSELF IN THE PAST MONTH?: NO
1. IN THE PAST MONTH, HAVE YOU WISHED YOU WERE DEAD OR WISHED YOU COULD GO TO SLEEP AND NOT WAKE UP?: NO
6. HAVE YOU EVER DONE ANYTHING, STARTED TO DO ANYTHING, OR PREPARED TO DO ANYTHING TO END YOUR LIFE?: NO

## 2024-05-08 NOTE — LETTER
May 8, 2024      To Whom It May Concern:      Mayco Castro was seen in our Emergency Department today, 05/08/24.  I expect his condition to improve over the next 1-2 days.  He may return to work/school when improved.    Sincerely,        Handy Raines MD

## 2024-05-08 NOTE — DISCHARGE INSTRUCTIONS
Use heating pad or Tylenol as needed for pain.  Stay well-hydrated and void frequently as we discussed.  If you develop a fever, uncontrolled pain or other new concerns present for further evaluation, otherwise nephrology clinic will reportedly reach out to you to arrange a follow-up with your primary nephrologist.

## 2024-05-08 NOTE — TELEPHONE ENCOUNTER
Update from Dr. Holland and Dr. Hannon to reach out to patient to schedule follow up.  Confirmed date/time with Dr. Holland.   Reached out to patient via [employer].  Gilma Kingsley RN, BSN, PHN  [facility] Nephrology Nurse Navigator  [phone]

## 2024-05-08 NOTE — TELEPHONE ENCOUNTER
Update from Dr. Holland for ok to order IR Kidney Biopsy if unable to get nephrology to schedule.  [id number] if needed.    Gilma Kingsley RN, BSN, PHN  [facility] Nephrology Nurse Navigator  [phone]

## 2024-05-08 NOTE — PROGRESS NOTES
Patient arrived at scheduling Pod in regards to being seen today. No opening today until 130pm with Dr. Bui. Message sent to provider to see if she would be willing to see patient. Currently seen by Dr. Holland. Patient experiencing lower back pain and blood in urine. Patient present picture showing toilet with sedimented blood. Patient was advise to go to ED/ Urgent care to be evaluated. Labs completed incase decides to wait for 130 appointment with Dr. Bui.  [facility] updated. Message sent to Koubar and Klomjit.Writer gave number to patient incase they have additional questions or concerns    Lab orders per [facility] protocols     Marissa Foote RN, BSN   Nephrology RN Care Coordinator   [facility]

## 2024-05-08 NOTE — ED PROVIDER NOTES
[facility] EMERGENCY DEPARTMENT ([facility])    5/08/24      ED PROVIDER NOTE   Gracy SOPHIA 9:37 AM   History     Chief Complaint   Patient presents with    Hematuria     Patient presents due to increased blood in urine and bilateral flank pain. Patient reports potential kidney disease; patient has been being seen for the past 6-9 months. Patient reports blood in the past, but increased today. Patient reports 4/10 pressure to bilateral flank.     The history is provided by the patient, medical records and a parent.     Mayco Castro is a 19 year old male who presents with bilateral flank pain and hematuria. Patient is here with his mother from [place], [place]. He started having daily hematuria 3-4 months ago.  Patient has been seeing [facility] nephrologist Dr. 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  He has had some flank pain with this as well.  He went to bed at usual state of health last night and this morning his hematuria was so red it was basically blood, despite hydrating copiously. He also has worse bilateral flank pain which is new. He was told to seek medical attention if his baseline symptoms worsened so nephrology clinic was contacted and outpatient orders were placed including CBC, UA, random urine protein, renal panel normal and albumin.  Patient just had those labs collected here and he was instructed to go to the ER for the low back pain and hematuria.  Message was sent with Dr. Bui of Nephrology as well in case patient wanted to be seen at 1:30 PM.  Patient came here under nurse recommendations.  Patient states that the last time he saw nephrologist was in March, is trying blood pressure medication which is supposed to help. He was taking lisinopril initially, was switched to losartan.  While this flank pain is the worst that he has had so far, he declines pain medications. He last voided just an hour ago for labs.     As for past  "medical history, patient notes that he bit by a tick as a child and developed infection with heart infection, myocarditis for which she was hospitalized at [facility].  He also has had an L4-5 discectomy.    Past Medical History  No past medical history on file.  No past surgical history on file.  losartan (COZAAR) 25 MG tablet      No Known Allergies  Family History  No family history on file.  Social History   Social History     Tobacco Use    Smoking status: Never    Smokeless tobacco: Never   Vaping Use    Vaping status: Never Used         A medically appropriate review of systems was performed with pertinent positives and negatives noted in the HPI, and all other systems negative.    Physical Exam   BP: 119/80  Pulse: 77  Temp: 98.3  F (36.8  C)  Resp: 16  Height: 185.4 cm (6' 1\")  Weight: 81.6 kg (180 lb)  SpO2: 98 %    Physical Exam  Vitals and nursing note reviewed.   Constitutional:       General: He is not in acute distress.     Appearance: Normal appearance. He is not toxic-appearing.   HENT:      Head: Atraumatic.   Eyes:      General: No scleral icterus.     Conjunctiva/sclera: Conjunctivae normal.   Cardiovascular:      Rate and Rhythm: Normal rate.      Heart sounds: Normal heart sounds.   Pulmonary:      Effort: Pulmonary effort is normal. No respiratory distress.      Breath sounds: Normal breath sounds.   Abdominal:      Palpations: Abdomen is soft.      Tenderness: There is no abdominal tenderness. There is right CVA tenderness and left CVA tenderness.   Musculoskeletal:         General: No deformity.      Cervical back: Neck supple.   Skin:     General: Skin is warm.   Neurological:      Mental Status: He is alert.           ED Course, Procedures, & Data      Procedures               Results for orders placed or performed during the hospital encounter of 05/08/24   CT Abdomen Pelvis w/o Contrast     Status: None    Narrative    CT ABDOMEN PELVIS W/O CONTRAST 5/8/2024 11:16 AM    CLINICAL " HISTORY: Lateral flank pain and hematuria, concern for renal  stone  TECHNIQUE: CT scan of the abdomen and pelvis was performed without IV  contrast. Multiplanar reformats were obtained. Dose reduction  techniques were used.  CONTRAST: None.    COMPARISON: 12/12/2023    FINDINGS:   LOWER CHEST: Normal.    HEPATOBILIARY: Normal.    PANCREAS: Normal.    SPLEEN: Normal.    ADRENAL GLANDS: Normal.    KIDNEYS/BLADDER: No calculi, hydronephrosis, perinephric stranding or  masses.    BOWEL: No small bowel or colonic obstruction or inflammatory changes.  Normal appendix.    LYMPH NODES: No lymphadenopathy.    VASCULATURE: Unremarkable.    PELVIC ORGANS: No pelvic masses.    OTHER: No free fluid or fluid collections. No free air.    MUSCULOSKELETAL: Normal.      Impression    IMPRESSION:   1.  No acute findings in the abdomen and pelvis. No urinary system  calculi.    WESLEY GAITAN MD         SYSTEM ID:  [id number]   Results for orders placed or performed in visit on 05/08/24   Renal panel     Status: Normal   Result Value Ref Range    Sodium 138 135 - 145 mmol/L    Potassium 4.4 3.4 - 5.3 mmol/L    Chloride 103 98 - 107 mmol/L    Carbon Dioxide (CO2) 25 22 - 29 mmol/L    Anion Gap 10 7 - 15 mmol/L    Glucose 92 70 - 99 mg/dL    Urea Nitrogen 15.2 6.0 - 20.0 mg/dL    Creatinine 1.00 0.67 - 1.17 mg/dL    GFR Estimate >90 >60 mL/min/1.73m2    Calcium 9.6 8.6 - 10.0 mg/dL    Albumin 4.5 3.5 - 5.2 g/dL    Phosphorus 3.7 2.5 - 4.5 mg/dL   UA with Microscopic     Status: Abnormal   Result Value Ref Range    Color Urine Light Yellow Colorless, Straw, Light Yellow, Yellow    Appearance Urine Slightly Cloudy (A) Clear    Glucose Urine Negative Negative mg/dL    Bilirubin Urine Negative Negative    Ketones Urine Negative Negative mg/dL    Specific Gravity Urine 1.014 1.003 - 1.035    Blood Urine Large (A) Negative    pH Urine 6.5 5.0 - 7.0    Protein Albumin Urine 100 (A) Negative mg/dL    Urobilinogen Urine Normal Normal, 2.0 mg/dL     Nitrite Urine Negative Negative    Leukocyte Esterase Urine Negative Negative    RBC Urine >182 (H) <=2 /HPF    WBC Urine 8 (H) <=5 /HPF   Protein  random urine     Status: Abnormal   Result Value Ref Range    Total Protein Urine mg/dL 133.0   mg/dL    Total Protein Urine mg/mg Creat 1.44 (H) 0.00 - 0.20 mg/mg Cr    Creatinine Urine mg/dL 92.5 mg/dL   CBC with platelets     Status: Normal   Result Value Ref Range    WBC Count 5.9 4.0 - 11.0 10e3/uL    RBC Count 5.16 4.40 - 5.90 10e6/uL    Hemoglobin 15.5 13.3 - 17.7 g/dL    Hematocrit 43.5 40.0 - 53.0 %    MCV 84 78 - 100 fL    MCH 30.0 26.5 - 33.0 pg    MCHC 35.6 31.5 - 36.5 g/dL    RDW 11.9 10.0 - 15.0 %    Platelet Count 194 150 - 450 10e3/uL     Medications - No data to display  Labs Ordered and Resulted from Time of ED Arrival to Time of ED Departure - No data to display  CT Abdomen Pelvis w/o Contrast   Final Result   IMPRESSION:    1.  No acute findings in the abdomen and pelvis. No urinary system   calculi.      WESLEY GAITAN MD            SYSTEM ID:  [id number]           Critical care was not performed.     Medical Decision Making  The patient's presentation was of moderate complexity (a chronic illness mild to moderate exacerbation, progression, or side effect of treatment).    The patient's evaluation involved:  review of external note(s) from 2 sources (reviewed prior urology and nephrology clinic notes)  review of 3+ test result(s) ordered prior to this encounter (review of prior urinalysis, prior renal ultrasound, prior CT urogram)  ordering and/or review of 1 test(s) in this encounter (see separate area of note for details)  discussion of management or test interpretation with another health professional (CT abdomen pelvis images personally reviewed and reviewed radiologist interpretation)  Discussion of management with another health professional.  Discussed with nephrology staff.    The patient's management necessitated only low risk  treatment.    Assessment & Plan    19-year-old male with a history of IgA nephropathy and some chronic flank pain and hematuria.  Today his symptoms were worse and so he had labs drawn and then was referred to the ED.  Differential includes exacerbation of IgA nephropathy, ureterolithiasis, UTI, neoplasm, other unspecified or previously undiagnosed kidney disease.  On exam his vital signs are normal and he appears in no distress.  He has mild bilateral CVA tenderness otherwise completely normal physical exam.  Labs have been drawn prior to his arrival significant for normal creatinine, microscopic hematuria without other signs of infection, normal hemoglobin and white blood cell count, normal platelets.  Normal electrolytes.  Case discussed with nephrology, a CT of the abdomen pelvis was obtained to rule out ureterolithiasis or other abnormality (previous imaging with renal ultrasound and CT urogram negative).  The CT is normal.  I spoke again with nephrology.  Given no change in labs, no signs of infection, no obstruction, no urinary retention, no kidney stone, and a known history of IgA nephropathy with symptoms somewhat typical of his chronic symptoms there is no further acute intervention indicated today.  This was discussed with the patient as well as palliative measures for relief such as Tylenol heating pad staying well-hydrated.  Was given return precautions and per nephrology they will reach out to arrange an expedient appointment with his outpatient nephrologist.      I have reviewed the nursing notes. I have reviewed the findings, diagnosis, plan and need for follow up with the patient.    New Prescriptions    No medications on file       Final diagnoses:   Bilateral flank pain   Gross hematuria   IgA nephropathy     Jyothi URIBE, am serving as a trained medical scribe to document services personally performed by Handy Raines MD based on the provider's statements to me on May 8, 2024.  This  document has been checked and approved by the attending provider.    I, Handy Raines MD, was physically present and have reviewed and verified the accuracy of this note documented by Jyothi Fowler, medical scribe.      Handy Raines MD     [facility] EMERGENCY DEPARTMENT  5/8/2024     Handy Raines MD  05/08/24 1968

## 2024-05-08 NOTE — TELEPHONE ENCOUNTER
Update from Dr. Holland requesting support to schedule Kidney Biopsy suspect for IgA Nephropathy.  Requires Biopsy for additional treatment options.    Call from Dr. Hannon who was paged from ER.  He is having ER get CT of abd without contrast to verify if kidney stone, labs look stable per ER and if CT clears ok to go home and follow up with Dr. Holland.  Updated Dr. Hannon of POC requesting kidney biopsy next week.  Sending to CC to support scheduling,  Gilma Kingsley RN, BSN, PHN  [facility] Nephrology Nurse Navigator  [phone]

## 2024-05-08 NOTE — ED TRIAGE NOTES
Patient presents due to increased blood in urine and bilateral flank pain. Patient reports potential kidney disease; patient has been being seen for the past 6-9 months. Patient reports blood in the past, but increased today. Patient reports 4/10 pressure to bilateral flank.  Patient called primary and had lab work done in clinic prior to arrival to emergency room     Triage Assessment (Adult)       Row Name 05/08/24 0927          Triage Assessment    Airway WDL WDL        Respiratory WDL    Respiratory WDL WDL        Skin Circulation/Temperature WDL    Skin Circulation/Temperature WDL WDL        Cardiac WDL    Cardiac WDL WDL        Peripheral/Neurovascular WDL    Peripheral Neurovascular WDL WDL        Cognitive/Neuro/Behavioral WDL    Cognitive/Neuro/Behavioral WDL WDL

## 2024-05-09 ENCOUNTER — PATIENT OUTREACH (OUTPATIENT)
Dept: NEPHROLOGY | Facility: CLINIC | Age: 19
End: 2024-05-09
Payer: COMMERCIAL

## 2024-05-09 DIAGNOSIS — R80.1 PERSISTENT PROTEINURIA: ICD-10-CM

## 2024-05-09 DIAGNOSIS — R31.0 GROSS HEMATURIA: Primary | ICD-10-CM

## 2024-05-13 ENCOUNTER — TELEPHONE (OUTPATIENT)
Dept: NEPHROLOGY | Facility: CLINIC | Age: 19
End: 2024-05-13
Payer: COMMERCIAL

## 2024-05-13 DIAGNOSIS — R80.1 PERSISTENT PROTEINURIA: ICD-10-CM

## 2024-05-13 DIAGNOSIS — N02.B9 IGA NEPHROPATHY: Primary | ICD-10-CM

## 2024-05-13 NOTE — TELEPHONE ENCOUNTER
Reached out to patient to confirm availability for appt this week with Dr. Holland.  Patient is available and ok for labs 1hr before appt, confirmed in person ok  Clinic and lab appt scheduled.  GN and ESR/CRP labs ordered per V.O. Dr. Skyler Kingsley RN, BSN, PHN  [facility] Nephrology Nurse Navigator  [phone]

## 2024-05-15 ENCOUNTER — PATIENT OUTREACH (OUTPATIENT)
Dept: NEPHROLOGY | Facility: CLINIC | Age: 19
End: 2024-05-15
Payer: COMMERCIAL

## 2024-05-15 ENCOUNTER — MYC MEDICAL ADVICE (OUTPATIENT)
Dept: INTERVENTIONAL RADIOLOGY/VASCULAR | Facility: CLINIC | Age: 19
End: 2024-05-15
Payer: COMMERCIAL

## 2024-05-15 NOTE — PROGRESS NOTES
Spoke with both Mayco and his mother, Sophia today regarding follow up and biopsy.  Dr. Holland is ok with canceling appointment with Mayco tomorrow (per Mayco's request due to the travel twice in one week down to [place]) if he can get biopsy done on Friday here in [place].  Mayco and his mother are in agreement with this plan. Writer asked IR biopsy  to give information to Mayco via [employer] message as he is working today and cannot answer phone. Will work to get Mayco rescheduled with Dr. Holland after biopsy to go over results.

## 2024-05-17 ENCOUNTER — APPOINTMENT (OUTPATIENT)
Dept: INTERVENTIONAL RADIOLOGY/VASCULAR | Facility: CLINIC | Age: 19
End: 2024-05-17
Attending: INTERNAL MEDICINE
Payer: COMMERCIAL

## 2024-05-17 ENCOUNTER — HOSPITAL ENCOUNTER (OUTPATIENT)
Facility: CLINIC | Age: 19
Discharge: HOME | End: 2024-05-17
Attending: INTERNAL MEDICINE | Admitting: STUDENT IN AN ORGANIZED HEALTH CARE EDUCATION/TRAINING PROGRAM
Payer: COMMERCIAL

## 2024-05-17 ENCOUNTER — APPOINTMENT (OUTPATIENT)
Dept: MEDSURG UNIT | Facility: CLINIC | Age: 19
End: 2024-05-17
Attending: INTERNAL MEDICINE
Payer: COMMERCIAL

## 2024-05-17 VITALS
RESPIRATION RATE: 18 BRPM | WEIGHT: 178.2 LBS | OXYGEN SATURATION: 96 % | DIASTOLIC BLOOD PRESSURE: 72 MMHG | BODY MASS INDEX: 23.62 KG/M2 | HEART RATE: 62 BPM | TEMPERATURE: 98.3 F | HEIGHT: 73 IN | SYSTOLIC BLOOD PRESSURE: 119 MMHG

## 2024-05-17 DIAGNOSIS — R31.0 GROSS HEMATURIA: ICD-10-CM

## 2024-05-17 DIAGNOSIS — R80.1 PERSISTENT PROTEINURIA: ICD-10-CM

## 2024-05-17 LAB — INR PPP: 0.97 (ref 0.85–1.15)

## 2024-05-17 PROCEDURE — 258N000003 HC RX IP 258 OP 636: Performed by: NURSE PRACTITIONER

## 2024-05-17 PROCEDURE — 50200 RENAL BIOPSY PERQ: CPT | Mod: LT | Performed by: STUDENT IN AN ORGANIZED HEALTH CARE EDUCATION/TRAINING PROGRAM

## 2024-05-17 PROCEDURE — C1889 IMPLANT/INSERT DEVICE, NOC: HCPCS

## 2024-05-17 PROCEDURE — 85610 PROTHROMBIN TIME: CPT | Performed by: NURSE PRACTITIONER

## 2024-05-17 PROCEDURE — 88305 TISSUE EXAM BY PATHOLOGIST: CPT | Mod: 26 | Performed by: PATHOLOGY

## 2024-05-17 PROCEDURE — 999N000133 HC STATISTIC PP CARE STAGE 2

## 2024-05-17 PROCEDURE — 88346 IMFLUOR 1ST 1ANTB STAIN PX: CPT | Mod: 26 | Performed by: PATHOLOGY

## 2024-05-17 PROCEDURE — 99152 MOD SED SAME PHYS/QHP 5/>YRS: CPT | Mod: GC | Performed by: STUDENT IN AN ORGANIZED HEALTH CARE EDUCATION/TRAINING PROGRAM

## 2024-05-17 PROCEDURE — 76942 ECHO GUIDE FOR BIOPSY: CPT | Mod: 26 | Performed by: STUDENT IN AN ORGANIZED HEALTH CARE EDUCATION/TRAINING PROGRAM

## 2024-05-17 PROCEDURE — 88350 IMFLUOR EA ADDL 1ANTB STN PX: CPT | Mod: 26 | Performed by: PATHOLOGY

## 2024-05-17 PROCEDURE — 250N000009 HC RX 250: Performed by: PHYSICIAN ASSISTANT

## 2024-05-17 PROCEDURE — 88313 SPECIAL STAINS GROUP 2: CPT | Mod: TC | Performed by: INTERNAL MEDICINE

## 2024-05-17 PROCEDURE — 88313 SPECIAL STAINS GROUP 2: CPT | Mod: 26 | Performed by: PATHOLOGY

## 2024-05-17 PROCEDURE — 999N000142 HC STATISTIC PROCEDURE PREP ONLY

## 2024-05-17 PROCEDURE — 36415 COLL VENOUS BLD VENIPUNCTURE: CPT | Performed by: NURSE PRACTITIONER

## 2024-05-17 PROCEDURE — 250N000011 HC RX IP 250 OP 636: Performed by: PHYSICIAN ASSISTANT

## 2024-05-17 PROCEDURE — 88348 ELECTRON MICROSCOPY DX: CPT | Mod: 26 | Performed by: PATHOLOGY

## 2024-05-17 PROCEDURE — 88348 ELECTRON MICROSCOPY DX: CPT | Mod: TC | Performed by: INTERNAL MEDICINE

## 2024-05-17 RX ORDER — NALOXONE HYDROCHLORIDE 0.4 MG/ML
0.4 INJECTION, SOLUTION INTRAMUSCULAR; INTRAVENOUS; SUBCUTANEOUS
Status: DISCONTINUED | OUTPATIENT
Start: 2024-05-17 | End: 2024-05-17

## 2024-05-17 RX ORDER — LIDOCAINE 40 MG/G
CREAM TOPICAL
Status: DISCONTINUED | OUTPATIENT
Start: 2024-05-17 | End: 2024-05-17

## 2024-05-17 RX ORDER — NALOXONE HYDROCHLORIDE 0.4 MG/ML
0.2 INJECTION, SOLUTION INTRAMUSCULAR; INTRAVENOUS; SUBCUTANEOUS
Status: DISCONTINUED | OUTPATIENT
Start: 2024-05-17 | End: 2024-05-17

## 2024-05-17 RX ORDER — SODIUM CHLORIDE 9 MG/ML
INJECTION, SOLUTION INTRAVENOUS CONTINUOUS
Status: DISCONTINUED | OUTPATIENT
Start: 2024-05-17 | End: 2024-05-17

## 2024-05-17 RX ORDER — FLUMAZENIL 0.1 MG/ML
0.2 INJECTION, SOLUTION INTRAVENOUS
Status: DISCONTINUED | OUTPATIENT
Start: 2024-05-17 | End: 2024-05-17

## 2024-05-17 RX ORDER — FENTANYL CITRATE 50 UG/ML
25-50 INJECTION, SOLUTION INTRAMUSCULAR; INTRAVENOUS EVERY 5 MIN PRN
Status: DISCONTINUED | OUTPATIENT
Start: 2024-05-17 | End: 2024-05-17

## 2024-05-17 RX ADMIN — FENTANYL CITRATE 50 MCG: 50 INJECTION INTRAMUSCULAR; INTRAVENOUS at 14:49

## 2024-05-17 RX ADMIN — LIDOCAINE HYDROCHLORIDE 8 ML: 10 INJECTION, SOLUTION EPIDURAL; INFILTRATION; INTRACAUDAL; PERINEURAL at 14:59

## 2024-05-17 RX ADMIN — FENTANYL CITRATE 50 MCG: 50 INJECTION INTRAMUSCULAR; INTRAVENOUS at 14:43

## 2024-05-17 RX ADMIN — MIDAZOLAM 1 MG: 1 INJECTION INTRAMUSCULAR; INTRAVENOUS at 14:48

## 2024-05-17 RX ADMIN — MIDAZOLAM 1 MG: 1 INJECTION INTRAMUSCULAR; INTRAVENOUS at 14:42

## 2024-05-17 RX ADMIN — SODIUM CHLORIDE: 9 INJECTION, SOLUTION INTRAVENOUS at 13:44

## 2024-05-17 RX ADMIN — MIDAZOLAM 1 MG: 1 INJECTION INTRAMUSCULAR; INTRAVENOUS at 14:55

## 2024-05-17 RX ADMIN — FENTANYL CITRATE 50 MCG: 50 INJECTION INTRAMUSCULAR; INTRAVENOUS at 14:55

## 2024-05-17 ASSESSMENT — ACTIVITIES OF DAILY LIVING (ADL)
ADLS_ACUITY_SCORE: 35

## 2024-05-17 NOTE — LETTER
[facility] [address]  [address] [address]  Phone: [phone]  Fax: [phone]    May 17, 2024        Mayco Castro  [address] [address]          To whom it may concern:    RE: Mayco Castro    Do not lift greater than 10 pounds for 48 hours. Patient may return to work 5/19/24 with the following:  No working or lifting restrictions    Please contact me for questions or concerns.      Sincerely,      SERAFIN Mckenna MD

## 2024-05-17 NOTE — PROGRESS NOTES
Pt is here for renal biopsy.  Pt is stable, AVSS and pain free. Conset is singed. IV in and IVF. INR=0.97.  CBC and BMP were done 5/8/24 and no need to repeat them per ISAAC Dubose.   Sophia, mom, is here with pt.  Cell # [phone]

## 2024-05-17 NOTE — PROGRESS NOTES
Pt came back from IR ~1515. Up on arrival to 2A: pt is stable, AVSS and pain free.  Pt is alox3. Denied pain.  LL back/bx site drsg cdi/no bleeding or heamtoma.  2 hrs  bed rest/

## 2024-05-17 NOTE — PROCEDURES
[facility]    Procedure: IR Procedure Note    Date/Time: 5/17/2024 3:13 PM    Performed by: Abhi cMkenna MD  Authorized by: Selwyn Bellamy MD      UNIVERSAL PROTOCOL   Site Marked: NA  Prior Images Obtained and Reviewed:  Yes  Required items: Required blood products, implants, devices and special equipment available    Patient identity confirmed:  Verbally with patient, arm band, provided demographic data and hospital-assigned identification number  Patient was reevaluated immediately before administering moderate or deep sedation or anesthesia  Confirmation Checklist:  Patient's identity using two indicators, relevant allergies, procedure was appropriate and matched the consent or emergent situation and correct equipment/implants were available  Time out: Immediately prior to the procedure a time out was called    Universal Protocol: the Joint Commission Universal Protocol was followed    Preparation: Patient was prepped and draped in usual sterile fashion       ANESTHESIA    Anesthesia:  Local infiltration  Local Anesthetic:  Lidocaine 1% without epinephrine      SEDATION  Patient Sedated: Yes    Sedation:  Fentanyl and midazolam  Vital signs: Vital signs monitored during sedation    See dictated procedure note for full details.  Findings: Left native kidney biopsy    Specimens: none    Complications: None    Condition: Stable    Plan: Left native kidney biopsy      PROCEDURE  Describe Procedure: Left native kidney biopsy  Patient Tolerance:  Patient tolerated the procedure well with no immediate complications  Length of time physician/provider present for 1:1 monitoring during sedation: 30

## 2024-05-17 NOTE — PRE-PROCEDURE
GENERAL PRE-PROCEDURE:   Procedure:  Image guided native renal biopsy  Date/Time:  5/17/2024 1:36 PM    Verbal consent obtained?: Yes    Written consent obtained?: Yes    Risks and benefits: Risks, benefits and alternatives were discussed    Consent given by:  Patient  Patient states understanding of procedure being performed: Yes    Patient's understanding of procedure matches consent: Yes    Procedure consent matches procedure scheduled: Yes    Expected level of sedation:  Moderate  Appropriately NPO:  Yes  ASA Class:  3  Mallampati  :  Grade 2- soft palate, base of uvula, tonsillar pillars, and portion of posterior pharyngeal wall visible  Lungs:  Lungs clear with good breath sounds bilaterally  Heart:  Normal heart sounds and rate  History & Physical reviewed:  History and physical reviewed and no updates needed  Statement of review:  I have reviewed the lab findings, diagnostic data, medications, and the plan for sedation

## 2024-05-17 NOTE — PROGRESS NOTES
Patient is tolerating liquids and foods, ambulating, urinating, puncture sites are stable ( no bleeding and no hematoma) and patient has a .  A+O x4 and making needs known.  AVSS.    IV access removed.  Education completed and outlined in AVSS.   Questions answered prior to discharge.  Belongings returned to patient at discharge.

## 2024-05-17 NOTE — IR NOTE
Patient Name: Mayco Castro  Medical Record Number: [id number]  Today's Date: 5/17/2024    Procedure: Random Native Kidney Biopsy  Proceduralist: Dr. Bellamy and Dr. Mckenna   Pathology present: Yes    Procedure Start: 1440  Procedure end: 1500  Sedation medications administered: Midazolam 3 mg, Fentanyl 150 mcg       Report given to: 2A RN  : LACY    Other Notes: Pt arrived to IR room 7 from . Consent reviewed. Pt denies any questions or concerns regarding procedure. Pt positioned prone and monitored per protocol. Pt tolerated procedure without any noted complications. Pt transferred back to .    4 passes made and samples sent to lab as ordered.  Gel-Foam used to close biopsy needle track.  Hemastatis achieved.  Patient to be on bedrest for 2 hours.

## 2024-05-17 NOTE — DISCHARGE INSTRUCTIONS
[facility]    Interventional Radiology  Patient Instructions Following Kidney Biopsy    AFTER YOU GO HOME  If you were given sedation, for the first 24 hours: we recommend that an adult stay with you, DO NOT drive or operate machinery at home or at work, DO NOT smoke, DO NOT make any important or legal decisions.  DO NOT drink alcoholic beverages the day of your procedure  Drink plenty of fluids   Resume your regular diet, unless otherwise instructed by your Primary Physician  Relax and take it easy for 48 hours  DO NOT do any strenuous exercise or lifting (> 10 lbs) for at least 7 days following your procedure  Keep the dressing dry and in place for 24 hours. Replace with Band aid for 2 days.  Never leave a wet dressing in place.  Do not take a shower for at least 12 hours following your procedure. No tub bath, hot tub, or swimming for 5 days.  There should be minimum drainage from the biopsy site    CALL THE PHYSICIAN IF:  You start bleeding from the procedure site.  If you do start to bleed from that site, lie down flat and hold pressure on the site for a minimum of 10 minutes.  Your physician will tell you if you need to return to the hospital  You develop nausea or vomiting  You have excessive swelling, redness, or tenderness at the site  You have drainage that looks like it is infected.  You experience severe pain  You develop hives or a rash or unexplained itching  You develop shortness of breath  You develop a temperature of 101 degrees F or greater  You develop bloody clots or red urine after you are discharged  You develop chest pain or cough up blood, lightheadedness or fainting    [facility] INTERVENTIONAL RADIOLOGY DEPARTMENT  Procedure Physician:  Selwyn Bellamy MD                                          Date of procedure: May 17, 2024  Telephone Numbers: [phone]      Monday-Friday 7:30 am to 4:00 pm  [phone] After 4:00 pm Monday-Friday, Weekends & Holidays.   Ask for the  Interventional Radiologist on call.  Someone is on call 24 hrs/day  [facility] toll free number: [phone] Monday-Friday 8:00 am to 4:30 pm  [facility] Emergency Dept: [phone]

## 2024-05-18 ENCOUNTER — NURSE TRIAGE (OUTPATIENT)
Dept: NURSING | Facility: CLINIC | Age: 19
End: 2024-05-18
Payer: COMMERCIAL

## 2024-05-18 NOTE — TELEPHONE ENCOUNTER
Pt calling in for his biopsy results. Results are still in process at this time. Advised calling back Monday.         No need for triage at this time.           Ana Miguel RN on 5/18/2024 at 4:43 PM

## 2024-05-21 LAB
PATH REPORT.COMMENTS IMP SPEC: NORMAL
PATH REPORT.FINAL DX SPEC: NORMAL
PATH REPORT.GROSS SPEC: NORMAL
PATH REPORT.MICROSCOPIC SPEC OTHER STN: NORMAL
PATH REPORT.RELEVANT HX SPEC: NORMAL
PHOTO IMAGE: NORMAL

## 2024-05-23 ENCOUNTER — OFFICE VISIT (OUTPATIENT)
Dept: NEPHROLOGY | Facility: CLINIC | Age: 19
End: 2024-05-23
Attending: INTERNAL MEDICINE
Payer: COMMERCIAL

## 2024-05-23 ENCOUNTER — LAB (OUTPATIENT)
Dept: LAB | Facility: CLINIC | Age: 19
End: 2024-05-23
Payer: COMMERCIAL

## 2024-05-23 VITALS
HEART RATE: 73 BPM | DIASTOLIC BLOOD PRESSURE: 69 MMHG | BODY MASS INDEX: 24.01 KG/M2 | OXYGEN SATURATION: 97 % | WEIGHT: 182 LBS | SYSTOLIC BLOOD PRESSURE: 112 MMHG

## 2024-05-23 DIAGNOSIS — R31.0 GROSS HEMATURIA: ICD-10-CM

## 2024-05-23 DIAGNOSIS — N02.B9 IGA NEPHROPATHY: ICD-10-CM

## 2024-05-23 DIAGNOSIS — R80.1 PERSISTENT PROTEINURIA: ICD-10-CM

## 2024-05-23 DIAGNOSIS — N02.B9 IGA NEPHROPATHY: Primary | ICD-10-CM

## 2024-05-23 LAB
ALBUMIN MFR UR ELPH: 47.1 MG/DL
ALBUMIN SERPL BCG-MCNC: 4.2 G/DL (ref 3.5–5.2)
ALBUMIN UR-MCNC: 50 MG/DL
ANION GAP SERPL CALCULATED.3IONS-SCNC: 8 MMOL/L (ref 7–15)
APPEARANCE UR: CLEAR
BASOPHILS # BLD AUTO: 0.1 10E3/UL (ref 0–0.2)
BASOPHILS NFR BLD AUTO: 1 %
BILIRUB UR QL STRIP: NEGATIVE
BUN SERPL-MCNC: 20.2 MG/DL (ref 6–20)
CALCIUM SERPL-MCNC: 9.3 MG/DL (ref 8.6–10)
CHLORIDE SERPL-SCNC: 104 MMOL/L (ref 98–107)
COLOR UR AUTO: ABNORMAL
CREAT SERPL-MCNC: 1.04 MG/DL (ref 0.67–1.17)
CREAT UR-MCNC: 98.3 MG/DL
CRP SERPL-MCNC: <3 MG/L
DEPRECATED HCO3 PLAS-SCNC: 28 MMOL/L (ref 22–29)
EGFRCR SERPLBLD CKD-EPI 2021: >90 ML/MIN/1.73M2
EOSINOPHIL # BLD AUTO: 0.2 10E3/UL (ref 0–0.7)
EOSINOPHIL NFR BLD AUTO: 2 %
ERYTHROCYTE [DISTWIDTH] IN BLOOD BY AUTOMATED COUNT: 11.6 % (ref 10–15)
ERYTHROCYTE [SEDIMENTATION RATE] IN BLOOD BY WESTERGREN METHOD: 9 MM/HR (ref 0–15)
GLUCOSE SERPL-MCNC: 72 MG/DL (ref 70–99)
GLUCOSE UR STRIP-MCNC: NEGATIVE MG/DL
HCT VFR BLD AUTO: 39.8 % (ref 40–53)
HGB BLD-MCNC: 14.3 G/DL (ref 13.3–17.7)
HGB UR QL STRIP: ABNORMAL
HYALINE CASTS: 2 /LPF
IMM GRANULOCYTES # BLD: 0 10E3/UL
IMM GRANULOCYTES NFR BLD: 0 %
KETONES UR STRIP-MCNC: NEGATIVE MG/DL
LEUKOCYTE ESTERASE UR QL STRIP: NEGATIVE
LYMPHOCYTES # BLD AUTO: 2.3 10E3/UL (ref 0.8–5.3)
LYMPHOCYTES NFR BLD AUTO: 33 %
MCH RBC QN AUTO: 30.2 PG (ref 26.5–33)
MCHC RBC AUTO-ENTMCNC: 35.9 G/DL (ref 31.5–36.5)
MCV RBC AUTO: 84 FL (ref 78–100)
MONOCYTES # BLD AUTO: 0.7 10E3/UL (ref 0–1.3)
MONOCYTES NFR BLD AUTO: 10 %
NEUTROPHILS # BLD AUTO: 3.7 10E3/UL (ref 1.6–8.3)
NEUTROPHILS NFR BLD AUTO: 54 %
NITRATE UR QL: NEGATIVE
NRBC # BLD AUTO: 0 10E3/UL
NRBC BLD AUTO-RTO: 0 /100
PH UR STRIP: 7 [PH] (ref 5–7)
PHOSPHATE SERPL-MCNC: 3.4 MG/DL (ref 2.5–4.5)
PLATELET # BLD AUTO: 246 10E3/UL (ref 150–450)
POTASSIUM SERPL-SCNC: 4.4 MMOL/L (ref 3.4–5.3)
PROT/CREAT 24H UR: 0.48 MG/MG CR (ref 0–0.2)
RBC # BLD AUTO: 4.73 10E6/UL (ref 4.4–5.9)
RBC URINE: 101 /HPF
SODIUM SERPL-SCNC: 140 MMOL/L (ref 135–145)
SP GR UR STRIP: 1.02 (ref 1–1.03)
UROBILINOGEN UR STRIP-MCNC: NORMAL MG/DL
WBC # BLD AUTO: 6.9 10E3/UL (ref 4–11)
WBC URINE: 1 /HPF

## 2024-05-23 PROCEDURE — G2211 COMPLEX E/M VISIT ADD ON: HCPCS | Performed by: INTERNAL MEDICINE

## 2024-05-23 PROCEDURE — 99000 SPECIMEN HANDLING OFFICE-LAB: CPT | Performed by: PATHOLOGY

## 2024-05-23 PROCEDURE — 82043 UR ALBUMIN QUANTITATIVE: CPT | Performed by: INTERNAL MEDICINE

## 2024-05-23 PROCEDURE — 99213 OFFICE O/P EST LOW 20 MIN: CPT | Performed by: INTERNAL MEDICINE

## 2024-05-23 PROCEDURE — 36415 COLL VENOUS BLD VENIPUNCTURE: CPT | Performed by: PATHOLOGY

## 2024-05-23 PROCEDURE — 81001 URINALYSIS AUTO W/SCOPE: CPT | Performed by: PATHOLOGY

## 2024-05-23 PROCEDURE — 84156 ASSAY OF PROTEIN URINE: CPT | Performed by: PATHOLOGY

## 2024-05-23 PROCEDURE — 85025 COMPLETE CBC W/AUTO DIFF WBC: CPT | Performed by: PATHOLOGY

## 2024-05-23 PROCEDURE — 86140 C-REACTIVE PROTEIN: CPT | Performed by: PATHOLOGY

## 2024-05-23 PROCEDURE — 99215 OFFICE O/P EST HI 40 MIN: CPT | Performed by: INTERNAL MEDICINE

## 2024-05-23 PROCEDURE — 85652 RBC SED RATE AUTOMATED: CPT | Performed by: PATHOLOGY

## 2024-05-23 PROCEDURE — 80069 RENAL FUNCTION PANEL: CPT | Performed by: PATHOLOGY

## 2024-05-23 ASSESSMENT — PAIN SCALES - GENERAL: PAINLEVEL: NO PAIN (0)

## 2024-05-23 NOTE — PATIENT INSTRUCTIONS
You have IgA nephropathy and things better today  Continue to avoid high salt diet  You may try taking fish oil  See you in 4 months with labs

## 2024-05-23 NOTE — LETTER
5/23/2024       RE: Mayco Castro  [address] [address]     Dear Colleague,    Thank you for referring your patient, Mayco Castro, to the [facility]PHROLOG[facility] [place] at [facility]. Please see a copy of my visit note below.      Nephrology Progress Note  05/23/2024   Chief complaint: Follow up for presumed IgAN  History of Present Illness:    Mayco Castro is a 19 year old M with prior Hx of myocarditis post insect sting who is here for Kidney Bx follow-up.     The patient was in his usual state of health until he has head cold and woke up in the morning with dark urine. He hen developed fever and urine become more red. He has no pain when urinate.  Patient went to see his primary care on 11/27/23 and his UA did show large blood and urine RBC more than 100.  UPCR was 1.8 g/g.  Cr 0.96 with eGFR >90. CT abdomen and pelvis with contrast on 11/27/23 showed normal kidney and bladder.  He was then referred to urology on 11/30/2023.  Who ordered CT urogram which was done on 12/12/2020 which she will normal kidney and bladder, no mass stone or obstruction.  Urology plan for cystoscopy which was scheduled for tomorrow.     Today, he reports that he feels good and the urine color has returned to normal.  He reported that the urine was clear after 1 week of onset.  He also noticed some foamy urine but this is also improved as well.  His dad has history of Buerger's disease and he has hematuria when he got sick.  The onset was an 9-year-old and he does not follow with a kidney doctor currently.  Unclear what is the kidney function at this time.  No other family member has kidney disease.  His dad is currently 58 years old.     Previously, the patient got bit by an insect and developed myocarditis.  He was prescribed lisinopril but since he recovered, lisinopril was discontinued.  Back then he has no side effects with lisinopril.  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     12/14/13: He feels good. Urine started to clear up after 1 week.  He denies any rashes or joint pain.  He had lobar back pain on the left side that radiated to his left leg and he also has numbness on his left toe.  His appetite was down a little bit but now back to normal.  He has lost about 12 pounds.  He denies any nausea vomiting or diarrhea.  He does not smoke or drink.  He works in welding business. He has UA done on 11/27/2023 which showed protein >=300 mg/dl, large blood and RBC more than 100. UPCR 1.83 mg/g. Cr was 0.96 and CRP 79.90. Some serologies was done which showed C3 125, C4 29, ANCA negative, LUZMARIA negative, chlamydia gonorrhea negative.  COVID, influenza, hepatitis C and HIV are negative.  Strep screen, syphilis are negative.  Urine culture negative.     Labs today show renal panel is pending.  UA showed large blood in the urine, RBC 37 cells and UPCR 0.41 g/g. US renal     3/18/24: He has been feeling good.  He still has foamy in the urine.  Patient stopped taking lisinopril as he does not feel good after taking the meds.  He felt that something wrong with in his chest.  However he does not measure blood pressure to see whether he developed hypotension or not.  He typically takes the meds in the evening but it still does not help with the symptoms.  Patient recently visited [place] and had some head colds.  At that time he had gross hematuria for couple days and spontaneously resolved. Labs today show creatinine 0.92, BUN 16, EGFR more than 90, potassium 4,carbon dioxide 26, CRP less than 3.  Hemoglobin 15.3, platelet 223.  UA showed large blood with RBC 68. UPCR 0.69 g/g.     5/23/24: In the interim, he has recurrent gross hematuria. He underwent a kidney Bx on 5/17/24 which showed IgA nephropathy, M1 E0 S0 T0 C0; cv1 ah1, <10% IFTA, no glomerulosclerosis in the sample.  He is feeling well today.  He reported he felt  really tired and nap all the time when he was getting sick.  He no longer has gross hematuria.  No rashes or joint pain. Labs today showed creatinine 1.04, GFR more than 90, BUN 20.2, potassium 4.4 and bicarb 28.  Albumin 4.2.  CRP less than 3.  White blood cell 6.9 hemoglobin 14.3 and platelet 246.  UA showed large blood and RBCs 101. UPCR is 0.48 from 1.44 in 5/8/24.     Past medical history  No past medical history on file.    Past surgical history  Past Surgical History:   Procedure Laterality Date     IR RENAL BIOPSY RIGHT  5/17/2024       Review of Systems:   14 systems were reviewed and all negative except as mentioned above.   Current Medications:  Current Outpatient Medications   Medication Sig Dispense Refill     losartan (COZAAR) 25 MG tablet Take 0.5 tablets (12.5 mg) by mouth daily 15 tablet 6     No current facility-administered medications for this visit.       Physical Exam:   /69   Pulse 73   Wt 82.6 kg (182 lb)   SpO2 97%   BMI 24.01 kg/m     Body mass index is 24.01 kg/m .    GENERAL APPEARANCE: Alert, not in acute distress  EYES:  Not pale conjunctiva, pupils equal  HENT: Mouth without ulcers or lesions  PULM: lungs clear to auscultation bilaterally, equal air movement, no clubbing  CV: regular rhythm, normal rate, no rub     -JVD no distended.      -edema: none  GI: soft,  - tender, no distended, bowel sounds are present  INTEGUMENT: No rash  NEURO:  Non focal. No asterixis.     Labs:   All labs reviewed by me  Last Renal Panel:  Sodium   Date Value Ref Range Status   05/23/2024 140 135 - 145 mmol/L Final     Comment:     Reference intervals for this test were updated on 09/26/2023 to more accurately reflect our healthy population. There may be differences in the flagging of prior results with similar values performed with this method. Interpretation of those prior results can be made in the context of the updated reference intervals.      Potassium   Date Value Ref Range Status    05/23/2024 4.4 3.4 - 5.3 mmol/L Final     Chloride   Date Value Ref Range Status   05/23/2024 104 98 - 107 mmol/L Final     Carbon Dioxide (CO2)   Date Value Ref Range Status   05/23/2024 28 22 - 29 mmol/L Final     Anion Gap   Date Value Ref Range Status   05/23/2024 8 7 - 15 mmol/L Final     Glucose   Date Value Ref Range Status   05/23/2024 72 70 - 99 mg/dL Final     Urea Nitrogen   Date Value Ref Range Status   05/23/2024 20.2 (H) 6.0 - 20.0 mg/dL Final     Creatinine   Date Value Ref Range Status   05/23/2024 1.04 0.67 - 1.17 mg/dL Final     GFR Estimate   Date Value Ref Range Status   05/23/2024 >90 >60 mL/min/1.73m2 Final     Calcium   Date Value Ref Range Status   05/23/2024 9.3 8.6 - 10.0 mg/dL Final     Phosphorus   Date Value Ref Range Status   05/23/2024 3.4 2.5 - 4.5 mg/dL Final     Albumin   Date Value Ref Range Status   05/23/2024 4.2 3.5 - 5.2 g/dL Final       Imaging:  I reviewed imaging studies.   US renal on 12/14/23:    HISTORY: Proteinuria/hematuria     TECHNIQUE: The kidneys and bladder were scanned in the standard  fashion with specialized ultrasound transducer(s) using both gray  scale and limited color/spectral Doppler techniques.     FINDINGS:     Right kidney: Measures 11.1 cm in length. Parenchyma is of normal  thickness and echogenicity. No focal mass. No hydronephrosis.     Left kidney: Measures 9.3 cm in length. Parenchyma is of normal  thickness and echogenicity. No focal mass. No hydronephrosis.      Bladder: Unremarkable.                                                                      IMPRESSION:  Normal renal ultrasound.      I have personally reviewed the examination and initial interpretation  and I agree with the findings.  Assessment & Recommendations:   Problem list  # Bx confirmed IgA nephropathy:  M1 E0 S0 T0 C0; cv1 ah1, <10% IFTA, no glomerulosclerosis  # Gross hematuria and proteinuria associated with URI  # Family Hx of IgA nephropathy: father  # Prior Hx of  myocarditis after insect bite  # Elevated CRP  He presented with acute onset gross hematuria and subnephrotic range proteinuria concurrent with the onset of URI symptoms. His kidney function remained normal Cr 0.9-1.0. UA show RBC more than 100 and UPCR 1.8 g/g in November 2023.  He has no nephrotic syndrome given normal serum albumin and no swelling. Serological workup to include LUZMARIA, ANCA, chlamydia, gonorrhea, COVID, influenza, hepatitis C, HIV, strep screen, syphilis were all negative.  Urine culture is negative.  CT urogram was unremarkable.  Moreover, his dad leelee has IgA when he was 11, known biopsy confirmed. initially saw the patient on 12/14/2023 and at that time he has been 0.68 and urine RBC 37.  I started him on lisinopril 2.5 mg/day but he could not tolerated due to chest symptoms. 3/18/24: He still reports intermittent gross hematuria.  Creatinine of 0.92 and UPCR had increased to 0.69 g/g.  He then has another episode of gross hematuria on 5/8/2024.  He went to the ER and ultrasound was unremarkable.  Repeat labs at that time show RBC more than 182 and urine protein 1.44 g/g.  So we proceed with a kidney biopsy on 5/17/2024.  No complication.  Biopsy showed IgA nephropathy, M1 E0 S0 T0 C0; cv1 ah1, <10% IFTA, no glomerulosclerosis.  Today he is here for follow-up, creatinine stable at 1.04 with GFR more than 90.  CRP and ESR.  UA show urine RBC and red cells.  UPCR improved to 0.48 g/g.  At this time the patient is not indicated for immunosuppression so we will continue conservative management at this time.  - Continue losartan 12.5 mg daily started since 3/18/2024  - Asked him to monitor blood pressure and how he feels; if he does not feel good then he should monitor his  blood pressure ensure that he has no hypertension  - Schedule low-salt diet and balance diet with physical activities  - Okay to take fish oil  -  Follow-up in 4 months with labs CBC, renal panel, UA, urine protein, CRP      Follow-up in 4 months with labs    The longitudinal plan of care for IgAN, gross hematuria, proteinuria was addressed during this visit. Due to the added complexity in care, I will continue to support Mayco Dickersonlakeshatuin the subsequent management of this condition(s) and with the ongoing continuity of care of this condition(s).    I spent  40 minutes on the date of the encounter doing chart review, history and exam, documentation and further activities as noted above. 25 minutes of this visit is dedicated to direct patient interaction via face-to-face.    Young Holland MD on 05/23/2024            Again, thank you for allowing me to participate in the care of your patient.      Sincerely,    Young Holland MD

## 2024-05-23 NOTE — NURSING NOTE
Chief Complaint   Patient presents with    RECHECK     Biopsy results.      Vitals:    05/23/24 1555 05/23/24 1600 05/23/24 1601 05/23/24 1603   BP: 114/71 114/69 108/69 112/69   BP Location: Right arm Right arm Left arm    Patient Position: Sitting Sitting Sitting    Cuff Size: Adult Regular Adult Regular Adult Regular    Pulse: 73      SpO2: 97%      Weight: 82.6 kg (182 lb)          BP Readings from Last 3 Encounters:   05/23/24 112/69   05/17/24 119/72   05/08/24 119/80       /69   Pulse 73   Wt 82.6 kg (182 lb)   SpO2 97%   BMI 24.01 kg/m       Joanie Mayfield

## 2024-05-23 NOTE — PROGRESS NOTES
Nephrology Progress Note  05/23/2024   Chief complaint: Follow up for presumed IgAN  History of Present Illness:    Mayco Castro is a 19 year old M with prior Hx of myocarditis post insect sting who is here for Kidney Bx follow-up.     The patient was in his usual state of health until he has head cold and woke up in the morning with dark urine. He hen developed fever and urine become more red. He has no pain when urinate.  Patient went to see his primary care on 11/27/23 and his UA did show large blood and urine RBC more than 100.  UPCR was 1.8 g/g.  Cr 0.96 with eGFR >90. CT abdomen and pelvis with contrast on 11/27/23 showed normal kidney and bladder.  He was then referred to urology on 11/30/2023.  Who ordered CT urogram which was done on 12/12/2020 which she will normal kidney and bladder, no mass stone or obstruction.  Urology plan for cystoscopy which was scheduled for tomorrow.     Today, he reports that he feels good and the urine color has returned to normal.  He reported that the urine was clear after 1 week of onset.  He also noticed some foamy urine but this is also improved as well.  His dad has history of Buerger's disease and he has hematuria when he got sick.  The onset was an 9-year-old and he does not follow with a kidney doctor currently.  Unclear what is the kidney function at this time.  No other family member has kidney disease.  His dad is currently 58 years old.     Previously, the patient got bit by an insect and developed myocarditis.  He was prescribed lisinopril but since he recovered, lisinopril was discontinued.  Back then he has no side effects with lisinopril.  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     12/14/13: He feels good. Urine started to clear up after 1 week.  He denies any rashes or joint pain.  He had lobar back pain on the left side that radiated to his left leg and he also has numbness on his left toe.  His appetite was  down a little bit but now back to normal.  He has lost about 12 pounds.  He denies any nausea vomiting or diarrhea.  He does not smoke or drink.  He works in welding business. He has UA done on 11/27/2023 which showed protein >=300 mg/dl, large blood and RBC more than 100. UPCR 1.83 mg/g. Cr was 0.96 and CRP 79.90. Some serologies was done which showed C3 125, C4 29, ANCA negative, LUZMARIA negative, chlamydia gonorrhea negative.  COVID, influenza, hepatitis C and HIV are negative.  Strep screen, syphilis are negative.  Urine culture negative.     Labs today show renal panel is pending.  UA showed large blood in the urine, RBC 37 cells and UPCR 0.41 g/g. US renal     3/18/24: He has been feeling good.  He still has foamy in the urine.  Patient stopped taking lisinopril as he does not feel good after taking the meds.  He felt that something wrong with in his chest.  However he does not measure blood pressure to see whether he developed hypotension or not.  He typically takes the meds in the evening but it still does not help with the symptoms.  Patient recently visited [place] and had some head colds.  At that time he had gross hematuria for couple days and spontaneously resolved. Labs today show creatinine 0.92, BUN 16, EGFR more than 90, potassium 4,carbon dioxide 26, CRP less than 3.  Hemoglobin 15.3, platelet 223.  UA showed large blood with RBC 68. UPCR 0.69 g/g.     5/23/24: In the interim, he has recurrent gross hematuria. He underwent a kidney Bx on 5/17/24 which showed IgA nephropathy, M1 E0 S0 T0 C0; cv1 ah1, <10% IFTA, no glomerulosclerosis in the sample.  He is feeling well today.  He reported he felt really tired and nap all the time when he was getting sick.  He no longer has gross hematuria.  No rashes or joint pain. Labs today showed creatinine 1.04, GFR more than 90, BUN 20.2, potassium 4.4 and bicarb 28.  Albumin 4.2.  CRP less than 3.  White blood cell 6.9 hemoglobin 14.3 and platelet 246.  UA showed  large blood and RBCs 101. UPCR is 0.48 from 1.44 in 5/8/24.     Past medical history  No past medical history on file.    Past surgical history  Past Surgical History:   Procedure Laterality Date    IR RENAL BIOPSY RIGHT  5/17/2024       Review of Systems:   14 systems were reviewed and all negative except as mentioned above.   Current Medications:  Current Outpatient Medications   Medication Sig Dispense Refill    losartan (COZAAR) 25 MG tablet Take 0.5 tablets (12.5 mg) by mouth daily 15 tablet 6     No current facility-administered medications for this visit.       Physical Exam:   /69   Pulse 73   Wt 82.6 kg (182 lb)   SpO2 97%   BMI 24.01 kg/m     Body mass index is 24.01 kg/m .    GENERAL APPEARANCE: Alert, not in acute distress  EYES:  Not pale conjunctiva, pupils equal  HENT: Mouth without ulcers or lesions  PULM: lungs clear to auscultation bilaterally, equal air movement, no clubbing  CV: regular rhythm, normal rate, no rub     -JVD no distended.      -edema: none  GI: soft,  - tender, no distended, bowel sounds are present  INTEGUMENT: No rash  NEURO:  Non focal. No asterixis.     Labs:   All labs reviewed by me  Last Renal Panel:  Sodium   Date Value Ref Range Status   05/23/2024 140 135 - 145 mmol/L Final     Comment:     Reference intervals for this test were updated on 09/26/2023 to more accurately reflect our healthy population. There may be differences in the flagging of prior results with similar values performed with this method. Interpretation of those prior results can be made in the context of the updated reference intervals.      Potassium   Date Value Ref Range Status   05/23/2024 4.4 3.4 - 5.3 mmol/L Final     Chloride   Date Value Ref Range Status   05/23/2024 104 98 - 107 mmol/L Final     Carbon Dioxide (CO2)   Date Value Ref Range Status   05/23/2024 28 22 - 29 mmol/L Final     Anion Gap   Date Value Ref Range Status   05/23/2024 8 7 - 15 mmol/L Final     Glucose   Date Value  Ref Range Status   05/23/2024 72 70 - 99 mg/dL Final     Urea Nitrogen   Date Value Ref Range Status   05/23/2024 20.2 (H) 6.0 - 20.0 mg/dL Final     Creatinine   Date Value Ref Range Status   05/23/2024 1.04 0.67 - 1.17 mg/dL Final     GFR Estimate   Date Value Ref Range Status   05/23/2024 >90 >60 mL/min/1.73m2 Final     Calcium   Date Value Ref Range Status   05/23/2024 9.3 8.6 - 10.0 mg/dL Final     Phosphorus   Date Value Ref Range Status   05/23/2024 3.4 2.5 - 4.5 mg/dL Final     Albumin   Date Value Ref Range Status   05/23/2024 4.2 3.5 - 5.2 g/dL Final       Imaging:  I reviewed imaging studies.   US renal on 12/14/23:    HISTORY: Proteinuria/hematuria     TECHNIQUE: The kidneys and bladder were scanned in the standard  fashion with specialized ultrasound transducer(s) using both gray  scale and limited color/spectral Doppler techniques.     FINDINGS:     Right kidney: Measures 11.1 cm in length. Parenchyma is of normal  thickness and echogenicity. No focal mass. No hydronephrosis.     Left kidney: Measures 9.3 cm in length. Parenchyma is of normal  thickness and echogenicity. No focal mass. No hydronephrosis.      Bladder: Unremarkable.                                                                      IMPRESSION:  Normal renal ultrasound.      I have personally reviewed the examination and initial interpretation  and I agree with the findings.  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 His kidney function remained normal Cr 0.9-1.0. UA show RBC more than 100 and UPCR 1.8 g/g in November 2023.  He has no nephrotic syndrome given normal  serum albumin and no swelling. Serological workup to include LUZMARIA, ANCA, chlamydia, gonorrhea, COVID, influenza, hepatitis C, HIV, strep screen, syphilis were all negative.  Urine culture is negative.  CT urogram was unremarkable.  Moreover, his dad leelee has IgA when he was 11, known biopsy confirmed. initially saw the patient on 12/14/2023 and at that time he has been 0.68 and urine RBC 37.  I started him on lisinopril 2.5 mg/day but he could not tolerated due to chest symptoms. 3/18/24: He still reports intermittent gross hematuria.  Creatinine of 0.92 and UPCR had increased to 0.69 g/g.  He then has another episode of gross hematuria on 5/8/2024.  He went to the ER and ultrasound was unremarkable.  Repeat labs at that time show RBC more than 182 and urine protein 1.44 g/g.  So we proceed with a kidney biopsy on 5/17/2024.  No complication.  Biopsy showed IgA nephropathy, M1 E0 S0 T0 C0; cv1 ah1, <10% IFTA, no glomerulosclerosis.  Today he is here for follow-up, creatinine stable at 1.04 with GFR more than 90.  CRP and ESR.  UA show urine RBC and red cells.  UPCR improved to 0.48 g/g.  At this time the patient is not indicated for immunosuppression so we will continue conservative management at this time.  - Continue losartan 12.5 mg daily started since 3/18/2024  - Asked him to monitor blood pressure and how he feels; if he does not feel good then he should monitor his  blood pressure ensure that he has no hypertension  - Schedule low-salt diet and balance diet with physical activities  - Okay to take fish oil  -  Follow-up in 4 months with labs CBC, renal panel, UA, urine protein, CRP     Follow-up in 4 months with labs    The longitudinal plan of care for IgAN, gross hematuria, proteinuria was addressed during this visit. Due to the added complexity in care, I will continue to support Mayco Sanchez the subsequent management of this condition(s) and with the ongoing continuity of care of this condition(s).    I  spent  40 minutes on the date of the encounter doing chart review, history and exam, documentation and further activities as noted above. 25 minutes of this visit is dedicated to direct patient interaction via face-to-face.    Young Holland MD on 05/23/2024

## 2024-05-24 ENCOUNTER — TELEPHONE (OUTPATIENT)
Dept: NEPHROLOGY | Facility: CLINIC | Age: 19
End: 2024-05-24
Payer: COMMERCIAL

## 2024-05-24 LAB
CREAT UR-MCNC: 99.4 MG/DL
MICROALBUMIN UR-MCNC: 264 MG/L
MICROALBUMIN/CREAT UR: 265.59 MG/G CR (ref 0–17)

## 2024-05-24 NOTE — LETTER
[facility] NEPHROLOGY CLINIC [place]  [address] [address]  Phone: [phone]  Fax: [phone]    May 24, 2024        Mayco Castro  [address] [address]          To whom it may concern:    RE: Mayco Castro    Patient was seen and treated yesterday, May 23rd, 2024, at our nephrology clinic and missed work.  Please excuse his absence.     Please contact me for questions or concerns.      Sincerely,      Young Holland M.D.   of Medicine  Division of Nephrology and Hypertension  [facility]   [address], [address],  [place], [address]  Phone: [phone]

## 2024-05-24 NOTE — TELEPHONE ENCOUNTER
[facility] Call Center    Phone Message    May a detailed message be left on voicemail: yes     Reason for Call: Form or Letter   Type or form/letter needing completion: Letter for Work for missing on 5/23  Provider: Klomjit  Date form needed: asap  Once completed: Fax form to: [phone]    Action Taken: Message routed to:  [facility] ([facility] Nephrology    Travel Screening: Not Applicable

## 2024-05-28 NOTE — TELEPHONE ENCOUNTER
Update from provider letter is ok to send.  Updated patient via [employer]    Gilma Kingsley RN, BSN, PHN  [facility] Nephrology Nurse Navigator  [phone]

## 2024-07-01 ENCOUNTER — OFFICE VISIT (OUTPATIENT)
Dept: NEPHROLOGY | Facility: CLINIC | Age: 19
End: 2024-07-01
Attending: INTERNAL MEDICINE
Payer: COMMERCIAL

## 2024-07-01 ENCOUNTER — LAB (OUTPATIENT)
Dept: LAB | Facility: CLINIC | Age: 19
End: 2024-07-01
Attending: INTERNAL MEDICINE
Payer: COMMERCIAL

## 2024-07-01 VITALS
DIASTOLIC BLOOD PRESSURE: 76 MMHG | WEIGHT: 188.9 LBS | SYSTOLIC BLOOD PRESSURE: 123 MMHG | BODY MASS INDEX: 24.92 KG/M2 | OXYGEN SATURATION: 97 % | HEART RATE: 81 BPM

## 2024-07-01 DIAGNOSIS — N02.B9 IGA NEPHROPATHY: Primary | ICD-10-CM

## 2024-07-01 DIAGNOSIS — N02.B9 IGA NEPHROPATHY: ICD-10-CM

## 2024-07-01 LAB
ALBUMIN MFR UR ELPH: 54.5 MG/DL
ALBUMIN SERPL BCG-MCNC: 4.6 G/DL (ref 3.5–5.2)
ALBUMIN UR-MCNC: 10 MG/DL
ANION GAP SERPL CALCULATED.3IONS-SCNC: 10 MMOL/L (ref 7–15)
APPEARANCE UR: CLEAR
BILIRUB UR QL STRIP: NEGATIVE
BUN SERPL-MCNC: 16.5 MG/DL (ref 6–20)
CALCIUM SERPL-MCNC: 9.9 MG/DL (ref 8.6–10)
CHLORIDE SERPL-SCNC: 103 MMOL/L (ref 98–107)
COLOR UR AUTO: ABNORMAL
CREAT SERPL-MCNC: 0.79 MG/DL (ref 0.67–1.17)
CREAT UR-MCNC: 77.1 MG/DL
CRP SERPL-MCNC: <3 MG/L
DEPRECATED HCO3 PLAS-SCNC: 25 MMOL/L (ref 22–29)
EGFRCR SERPLBLD CKD-EPI 2021: >90 ML/MIN/1.73M2
ERYTHROCYTE [DISTWIDTH] IN BLOOD BY AUTOMATED COUNT: 12 % (ref 10–15)
ERYTHROCYTE [SEDIMENTATION RATE] IN BLOOD BY WESTERGREN METHOD: 6 MM/HR (ref 0–15)
GLUCOSE SERPL-MCNC: 81 MG/DL (ref 70–99)
GLUCOSE UR STRIP-MCNC: NEGATIVE MG/DL
HCT VFR BLD AUTO: 41 % (ref 40–53)
HGB BLD-MCNC: 14.9 G/DL (ref 13.3–17.7)
HGB UR QL STRIP: ABNORMAL
KETONES UR STRIP-MCNC: NEGATIVE MG/DL
LEUKOCYTE ESTERASE UR QL STRIP: NEGATIVE
MCH RBC QN AUTO: 30.3 PG (ref 26.5–33)
MCHC RBC AUTO-ENTMCNC: 36.3 G/DL (ref 31.5–36.5)
MCV RBC AUTO: 84 FL (ref 78–100)
MUCOUS THREADS #/AREA URNS LPF: PRESENT /LPF
NITRATE UR QL: NEGATIVE
PH UR STRIP: 7 [PH] (ref 5–7)
PHOSPHATE SERPL-MCNC: 3.2 MG/DL (ref 2.5–4.5)
PLATELET # BLD AUTO: 235 10E3/UL (ref 150–450)
POTASSIUM SERPL-SCNC: 4 MMOL/L (ref 3.4–5.3)
PROT/CREAT 24H UR: 0.71 MG/MG CR (ref 0–0.2)
RBC # BLD AUTO: 4.91 10E6/UL (ref 4.4–5.9)
RBC URINE: 60 /HPF
SODIUM SERPL-SCNC: 138 MMOL/L (ref 135–145)
SP GR UR STRIP: 1.02 (ref 1–1.03)
UROBILINOGEN UR STRIP-MCNC: NORMAL MG/DL
WBC # BLD AUTO: 7.1 10E3/UL (ref 4–11)
WBC URINE: <1 /HPF

## 2024-07-01 PROCEDURE — 85027 COMPLETE CBC AUTOMATED: CPT | Performed by: PATHOLOGY

## 2024-07-01 PROCEDURE — G2211 COMPLEX E/M VISIT ADD ON: HCPCS | Performed by: INTERNAL MEDICINE

## 2024-07-01 PROCEDURE — 36415 COLL VENOUS BLD VENIPUNCTURE: CPT | Performed by: PATHOLOGY

## 2024-07-01 PROCEDURE — 81001 URINALYSIS AUTO W/SCOPE: CPT | Performed by: PATHOLOGY

## 2024-07-01 PROCEDURE — 86140 C-REACTIVE PROTEIN: CPT | Performed by: PATHOLOGY

## 2024-07-01 PROCEDURE — 99214 OFFICE O/P EST MOD 30 MIN: CPT | Performed by: INTERNAL MEDICINE

## 2024-07-01 PROCEDURE — 85652 RBC SED RATE AUTOMATED: CPT | Performed by: PATHOLOGY

## 2024-07-01 PROCEDURE — 84156 ASSAY OF PROTEIN URINE: CPT | Performed by: PATHOLOGY

## 2024-07-01 PROCEDURE — 99213 OFFICE O/P EST LOW 20 MIN: CPT | Performed by: INTERNAL MEDICINE

## 2024-07-01 PROCEDURE — 80069 RENAL FUNCTION PANEL: CPT | Performed by: PATHOLOGY

## 2024-07-01 RX ORDER — LOSARTAN POTASSIUM 25 MG/1
25 TABLET ORAL DAILY
Qty: 30 TABLET | Refills: 6 | Status: SHIPPED | OUTPATIENT
Start: 2024-07-01

## 2024-07-01 NOTE — PROGRESS NOTES
Nephrology Progress Note  07/01/2024   Chief complaint: Follow up for presumed IgAN  History of Present Illness:    Mayco Castro is a 19 year old M with prior Hx of myocarditis post insect sting who is here for IgAN follow-up.      The patient was in his usual state of health until he has head cold and woke up in the morning with dark urine. He hen developed fever and urine become more red. He has no pain when urinate.  Patient went to see his primary care on 11/27/23 and his UA did show large blood and urine RBC more than 100.  UPCR was 1.8 g/g.  Cr 0.96 with eGFR >90. CT abdomen and pelvis with contrast on 11/27/23 showed normal kidney and bladder.  He was then referred to urology on 11/30/2023.  Who ordered CT urogram which was done on 12/12/2020 which she will normal kidney and bladder, no mass stone or obstruction.  Urology plan for cystoscopy which was scheduled for tomorrow.     Today, he reports that he feels good and the urine color has returned to normal.  He reported that the urine was clear after 1 week of onset.  He also noticed some foamy urine but this is also improved as well.  His dad has history of Buerger's disease and he has hematuria when he got sick.  The onset was an 9-year-old and he does not follow with a kidney doctor currently.  Unclear what is the kidney function at this time.  No other family member has kidney disease.  His dad is currently 58 years old.     Previously, the patient got bit by an insect and developed myocarditis.  He was prescribed lisinopril but since he recovered, lisinopril was discontinued.  Back then he has no side effects with lisinopril.  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     12/14/13: He feels good. Urine started to clear up after 1 week.  He denies any rashes or joint pain.  He had lobar back pain on the left side that radiated to his left leg and he also has numbness on his left toe.  His appetite was  down a little bit but now back to normal.  He has lost about 12 pounds.  He denies any nausea vomiting or diarrhea.  He does not smoke or drink.  He works in welding business. He has UA done on 11/27/2023 which showed protein >=300 mg/dl, large blood and RBC more than 100. UPCR 1.83 mg/g. Cr was 0.96 and CRP 79.90. Some serologies was done which showed C3 125, C4 29, ANCA negative, LUZMARIA negative, chlamydia gonorrhea negative.  COVID, influenza, hepatitis C and HIV are negative.  Strep screen, syphilis are negative.  Urine culture negative.     Labs today show renal panel is pending.  UA showed large blood in the urine, RBC 37 cells and UPCR 0.41 g/g. US renal     3/18/24: He has been feeling good.  He still has foamy in the urine.  Patient stopped taking lisinopril as he does not feel good after taking the meds.  He felt that something wrong with in his chest.  However he does not measure blood pressure to see whether he developed hypotension or not.  He typically takes the meds in the evening but it still does not help with the symptoms.  Patient recently visited [place] and had some head colds.  At that time he had gross hematuria for couple days and spontaneously resolved. Labs today show creatinine 0.92, BUN 16, EGFR more than 90, potassium 4,carbon dioxide 26, CRP less than 3.  Hemoglobin 15.3, platelet 223.  UA showed large blood with RBC 68. UPCR 0.69 g/g.     5/23/24: In the interim, he has recurrent gross hematuria. He underwent a kidney Bx on 5/17/24 which showed IgA nephropathy, M1 E0 S0 T0 C0; cv1 ah1, <10% IFTA, no glomerulosclerosis in the sample.  He is feeling well today.  He reported he felt really tired and nap all the time when he was getting sick.  He no longer has gross hematuria.  No rashes or joint pain. Labs today showed creatinine 1.04, GFR more than 90, BUN 20.2, potassium 4.4 and bicarb 28.  Albumin 4.2.  CRP less than 3.  White blood cell 6.9 hemoglobin 14.3 and platelet 246.  UA showed  large blood and RBCs 101. UPCR is 0.48 from 1.44 in 5/8/24.     7/1/24: He has been feeling good with no flare ups of igA nephropathy, since his last visit. He does not have any edema, SOB, joint pain, rash, abdominal pain, chest pain, foamy urine or dysuria.   Labs today showed creatinine 0.79, BUN 16.5, potassium 4, bicarb 25, calcium 9.9, phosphorus 3.2, albumin 4.6, CRP less than 3, and ESR of 6.  White blood cell 7.1, hemoglobin 14.9, platelet 235.  UA showed small blood, RBC 60/hpf. UPCR is 0.71 from 0.48.     Past medical history  No past medical history on file.    Past surgical history  Past Surgical History:   Procedure Laterality Date    IR RENAL BIOPSY RIGHT  5/17/2024       Review of Systems:   14 systems were reviewed and all negative except as mentioned above.   Current Medications:  Current Outpatient Medications   Medication Sig Dispense Refill    losartan (COZAAR) 25 MG tablet Take 1 tablet (25 mg) by mouth daily 30 tablet 6     No current facility-administered medications for this visit.       Physical Exam:   /76   Pulse 81   Wt 85.7 kg (188 lb 14.4 oz)   SpO2 97%   BMI 24.92 kg/m     Body mass index is 24.92 kg/m .    GENERAL APPEARANCE: Alert, not in acute distress  EYES:  Not pale conjunctiva, pupils equal  HENT: Mouth without ulcers or lesions  PULM: lungs clear to auscultation bilaterally, equal air movement, no clubbing  CV: regular rhythm, normal rate, no rub     -JVD no distended.      -edema: none  GI: soft,  - tender, no distended, bowel sounds are present  INTEGUMENT: No rash  NEURO:  Non focal. No asterixis.     Labs:   All labs reviewed by me  Last Renal Panel:  Sodium   Date Value Ref Range Status   07/01/2024 138 135 - 145 mmol/L Final     Potassium   Date Value Ref Range Status   07/01/2024 4.0 3.4 - 5.3 mmol/L Final     Chloride   Date Value Ref Range Status   07/01/2024 103 98 - 107 mmol/L Final     Carbon Dioxide (CO2)   Date Value Ref Range Status   07/01/2024 25 22  - 29 mmol/L Final     Anion Gap   Date Value Ref Range Status   07/01/2024 10 7 - 15 mmol/L Final     Glucose   Date Value Ref Range Status   07/01/2024 81 70 - 99 mg/dL Final     Urea Nitrogen   Date Value Ref Range Status   07/01/2024 16.5 6.0 - 20.0 mg/dL Final     Creatinine   Date Value Ref Range Status   07/01/2024 0.79 0.67 - 1.17 mg/dL Final     GFR Estimate   Date Value Ref Range Status   07/01/2024 >90 >60 mL/min/1.73m2 Final     Comment:     eGFR calculated using 2021 CKD-EPI equation.     Calcium   Date Value Ref Range Status   07/01/2024 9.9 8.6 - 10.0 mg/dL Final     Phosphorus   Date Value Ref Range Status   07/01/2024 3.2 2.5 - 4.5 mg/dL Final     Albumin   Date Value Ref Range Status   07/01/2024 4.6 3.5 - 5.2 g/dL Final       Imaging:  I reviewed imaging studies.   US renal on 12/14/23:    HISTORY: Proteinuria/hematuria     TECHNIQUE: The kidneys and bladder were scanned in the standard  fashion with specialized ultrasound transducer(s) using both gray  scale and limited color/spectral Doppler techniques.     FINDINGS:     Right kidney: Measures 11.1 cm in length. Parenchyma is of normal  thickness and echogenicity. No focal mass. No hydronephrosis.     Left kidney: Measures 9.3 cm in length. Parenchyma is of normal  thickness and echogenicity. No focal mass. No hydronephrosis.      Bladder: Unremarkable.                                                                      IMPRESSION:  Normal renal ultrasound.      I have personally reviewed the examination and initial interpretation  and I agree with the findings.  Assessment & Recommendations:   Problem list  # Bx confirmed IgA nephropathy:  M1 E0 S0 T0 C0; cv1 ah1, <10% IFTA, no glomerulosclerosis  # Gross hematuria and proteinuria associated with URI  # Family Hx of IgA nephropathy: father  # Prior Hx of myocarditis after insect bite  # Elevated CRP now normalized  He presented with acute onset gross hematuria and subnephrotic range proteinuria  concurrent with the onset of URI symptoms. His kidney function remained normal Cr 0.9-1.0. UA show RBC more than 100 and UPCR 1.8 g/g in November 2023.  He has no nephrotic syndrome given normal serum albumin and no swelling. Serological workup to include LUZMARIA, ANCA, chlamydia, gonorrhea, COVID, influenza, hepatitis C, HIV, strep screen, syphilis were all negative.  Urine culture is negative.  CT urogram was unremarkable.  Moreover, his dad also has IgA when he was 11, known biopsy confirmed. initially saw the patient on 12/14/2023 and at that time he has been 0.68 and urine RBC 37.  I started him on lisinopril 2.5 mg/day but he could not tolerated due to chest symptoms. 3/18/24: He then reported intermittent gross hematuria. Then the creatinine was 0.92 and UPCR had increased to 0.69 g/g.  He then has another episode of gross hematuria on 5/8/2024.  He went to the ER and ultrasound was unremarkable.  Repeat labs at that time show RBC more than 182 and urine protein 1.44 g/g.  So we proceed with a kidney biopsy on 5/17/2024.  No complication.  Biopsy showed IgA nephropathy, M1 E0 S0 T0 C0; cv1 ah1, <10% IFTA, no glomerulosclerosis.  Today he is here for follow-up, creatinine stable at 0.79 with GFR more than 90.  CRP and ESR were negative.  UA show small urine RBC, down to 60 red cells/hpf from 100 cells/hpf.  UPCR increased slightly from 0.48 to 0.71 g/g.  At this time the patient is not indicated for immunosuppression so we will continue conservative management at this time.  -Will increase losartan from 12.5 mg/day to 25 mg/day  - Asked him to monitor blood pressure and how he feels; if he does not feel good then he should monitor his  blood pressure ensure that he has no hypertension, lightheadedness or dizziness   - OK to take Fish oil  - Schedule low-salt diet and balance diet with physical activities  - Follow-up in 6 months with labs CBC, renal panel, UA, urine protein  - Discussed low salt diet    Follow-up  in 6 months with labs    The longitudinal plan of care for IgAN, gross hematuria, proteinuria was addressed during this visit. Due to the added complexity in care, I will continue to support Alissacan Gloriain the subsequent management of this condition(s) and with the ongoing continuity of care of this condition(s).    I spent  30 minutes on the date of the encounter doing chart review, history and exam, documentation and further activities as noted above. 25 minutes of this visit is dedicated to direct patient interaction via face-to-face.    Young Holland MD on 07/01/2024

## 2024-07-01 NOTE — LETTER
7/1/2024       RE: Mayco Castro  [address] [place] [facility] [address]     Dear Colleague,    Thank you for referring your patient, Mayco Castro, to the [facility] [place] at [facility]. Please see a copy of my visit note below.      Nephrology Progress Note  07/01/2024   Chief complaint: Follow up for presumed IgAN  History of Present Illness:    Mayco Castro is a 19 year old M with prior Hx of myocarditis post insect sting who is here for IgAN follow-up.      The patient was in his usual state of health until he has head cold and woke up in the morning with dark urine. He hen developed fever and urine become more red. He has no pain when urinate.  Patient went to see his primary care on 11/27/23 and his UA did show large blood and urine RBC more than 100.  UPCR was 1.8 g/g.  Cr 0.96 with eGFR >90. CT abdomen and pelvis with contrast on 11/27/23 showed normal kidney and bladder.  He was then referred to urology on 11/30/2023.  Who ordered CT urogram which was done on 12/12/2020 which she will normal kidney and bladder, no mass stone or obstruction.  Urology plan for cystoscopy which was scheduled for tomorrow.     Today, he reports that he feels good and the urine color has returned to normal.  He reported that the urine was clear after 1 week of onset.  He also noticed some foamy urine but this is also improved as well.  His dad has history of Buerger's disease and he has hematuria when he got sick.  The onset was an 9-year-old and he does not follow with a kidney doctor currently.  Unclear what is the kidney function at this time.  No other family member has kidney disease.  His dad is currently 58 years old.     Previously, the patient got bit by an insect and developed myocarditis.  He was prescribed lisinopril but since he recovered, lisinopril was discontinued.  Back then he has no side effects with lisinopril.  He also had history  of this herniation and underwent surgery when he was 15 years old.     12/14/13: He feels good. Urine started to clear up after 1 week.  He denies any rashes or joint pain.  He had lobar back pain on the left side that radiated to his left leg and he also has numbness on his left toe.  His appetite was down a little bit but now back to normal.  He has lost about 12 pounds.  He denies any nausea vomiting or diarrhea.  He does not smoke or drink.  He works in welding business. He has UA done on 11/27/2023 which showed protein >=300 mg/dl, large blood and RBC more than 100. UPCR 1.83 mg/g. Cr was 0.96 and CRP 79.90. Some serologies was done which showed C3 125, C4 29, ANCA negative, LUZMARIA negative, chlamydia gonorrhea negative.  COVID, influenza, hepatitis C and HIV are negative.  Strep screen, syphilis are negative.  Urine culture negative.     Labs today show renal panel is pending.  UA showed large blood in the urine, RBC 37 cells and UPCR 0.41 g/g. US renal     3/18/24: He has been feeling good.  He still has foamy in the urine.  Patient stopped taking lisinopril as he does not feel good after taking the meds.  He felt that something wrong with in his chest.  However he does not measure blood pressure to see whether he developed hypotension or not.  He typically takes the meds in the evening but it still does not help with the symptoms.  Patient recently visited [place] and had some head colds.  At that time he had gross hematuria for couple days and spontaneously resolved. Labs today show creatinine 0.92, BUN 16, EGFR more than 90, potassium 4,carbon dioxide 26, CRP less than 3.  Hemoglobin 15.3, platelet 223.  UA showed large blood with RBC 68. UPCR 0.69 g/g.     5/23/24: In the interim, he has recurrent gross hematuria. He underwent a kidney Bx on 5/17/24 which showed IgA nephropathy, M1 E0 S0 T0 C0; cv1 ah1, <10% IFTA, no glomerulosclerosis in the sample.  He is feeling well today.  He reported he felt really  tired and nap all the time when he was getting sick.  He no longer has gross hematuria.  No rashes or joint pain. Labs today showed creatinine 1.04, GFR more than 90, BUN 20.2, potassium 4.4 and bicarb 28.  Albumin 4.2.  CRP less than 3.  White blood cell 6.9 hemoglobin 14.3 and platelet 246.  UA showed large blood and RBCs 101. UPCR is 0.48 from 1.44 in 5/8/24.     7/1/24: He has been feeling good with no flare ups of igA nephropathy, since his last visit. He does not have any edema, SOB, joint pain, rash, abdominal pain, chest pain, foamy urine or dysuria.   Labs today showed creatinine 0.79, BUN 16.5, potassium 4, bicarb 25, calcium 9.9, phosphorus 3.2, albumin 4.6, CRP less than 3, and ESR of 6.  White blood cell 7.1, hemoglobin 14.9, platelet 235.  UA showed small blood, RBC 60/hpf. UPCR is 0.71 from 0.48.     Past medical history  No past medical history on file.    Past surgical history  Past Surgical History:   Procedure Laterality Date     IR RENAL BIOPSY RIGHT  5/17/2024       Review of Systems:   14 systems were reviewed and all negative except as mentioned above.   Current Medications:  Current Outpatient Medications   Medication Sig Dispense Refill     losartan (COZAAR) 25 MG tablet Take 1 tablet (25 mg) by mouth daily 30 tablet 6     No current facility-administered medications for this visit.       Physical Exam:   /76   Pulse 81   Wt 85.7 kg (188 lb 14.4 oz)   SpO2 97%   BMI 24.92 kg/m     Body mass index is 24.92 kg/m .    GENERAL APPEARANCE: Alert, not in acute distress  EYES:  Not pale conjunctiva, pupils equal  HENT: Mouth without ulcers or lesions  PULM: lungs clear to auscultation bilaterally, equal air movement, no clubbing  CV: regular rhythm, normal rate, no rub     -JVD no distended.      -edema: none  GI: soft,  - tender, no distended, bowel sounds are present  INTEGUMENT: No rash  NEURO:  Non focal. No asterixis.     Labs:   All labs reviewed by me  Last Renal Panel:  Sodium    Date Value Ref Range Status   07/01/2024 138 135 - 145 mmol/L Final     Potassium   Date Value Ref Range Status   07/01/2024 4.0 3.4 - 5.3 mmol/L Final     Chloride   Date Value Ref Range Status   07/01/2024 103 98 - 107 mmol/L Final     Carbon Dioxide (CO2)   Date Value Ref Range Status   07/01/2024 25 22 - 29 mmol/L Final     Anion Gap   Date Value Ref Range Status   07/01/2024 10 7 - 15 mmol/L Final     Glucose   Date Value Ref Range Status   07/01/2024 81 70 - 99 mg/dL Final     Urea Nitrogen   Date Value Ref Range Status   07/01/2024 16.5 6.0 - 20.0 mg/dL Final     Creatinine   Date Value Ref Range Status   07/01/2024 0.79 0.67 - 1.17 mg/dL Final     GFR Estimate   Date Value Ref Range Status   07/01/2024 >90 >60 mL/min/1.73m2 Final     Comment:     eGFR calculated using 2021 CKD-EPI equation.     Calcium   Date Value Ref Range Status   07/01/2024 9.9 8.6 - 10.0 mg/dL Final     Phosphorus   Date Value Ref Range Status   07/01/2024 3.2 2.5 - 4.5 mg/dL Final     Albumin   Date Value Ref Range Status   07/01/2024 4.6 3.5 - 5.2 g/dL Final       Imaging:  I reviewed imaging studies.   US renal on 12/14/23:    HISTORY: Proteinuria/hematuria     TECHNIQUE: The kidneys and bladder were scanned in the standard  fashion with specialized ultrasound transducer(s) using both gray  scale and limited color/spectral Doppler techniques.     FINDINGS:     Right kidney: Measures 11.1 cm in length. Parenchyma is of normal  thickness and echogenicity. No focal mass. No hydronephrosis.     Left kidney: Measures 9.3 cm in length. Parenchyma is of normal  thickness and echogenicity. No focal mass. No hydronephrosis.      Bladder: Unremarkable.                                                                      IMPRESSION:  Normal renal ultrasound.      I have personally reviewed the examination and initial interpretation  and I agree with the findings.  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 His kidney function remained normal Cr 0.9-1.0. UA show RBC more than 100 and UPCR 1.8 g/g in November 2023.  He has no nephrotic syndrome given normal serum albumin and no swelling. Serological workup to include LUZMARIA, ANCA, chlamydia, gonorrhea, COVID, influenza, hepatitis C, HIV, strep screen, syphilis were all negative.  Urine culture is negative.  CT urogram was unremarkable.  Moreover, his dad also has IgA when he was 11, known biopsy confirmed. initially saw the patient on 12/14/2023 and at that time he has been 0.68 and urine RBC 37.  I started him on lisinopril 2.5 mg/day but he could not tolerated due to chest symptoms. 3/18/24: He then reported intermittent gross hematuria. Then the creatinine was 0.92 and UPCR had increased to 0.69 g/g.  He then has another episode of gross hematuria on 5/8/2024.  He went to the ER and ultrasound was unremarkable.  Repeat labs at that time show RBC more than 182 and urine protein 1.44 g/g.  So we proceed with a kidney biopsy on 5/17/2024.  No complication.  Biopsy showed IgA nephropathy, M1 E0 S0 T0 C0; cv1 ah1, <10% IFTA, no glomerulosclerosis.  Today he is here for follow-up, creatinine stable at 0.79 with GFR more than 90.  CRP and ESR were negative.  UA show small urine RBC, down to 60 red cells/hpf from 100 cells/hpf.  UPCR increased slightly from 0.48 to 0.71 g/g.  At this time the patient is not indicated for immunosuppression so we will continue conservative management at this time.  -Will increase losartan from 12.5 mg/day to 25 mg/day  - Asked him to monitor blood pressure and how he feels; if he does not feel good  then he should monitor his  blood pressure ensure that he has no hypertension, lightheadedness or dizziness   - OK to take Fish oil  - Schedule low-salt diet and balance diet with physical activities  - Follow-up in 6 months with labs CBC, renal panel, UA, urine protein  - Discussed low salt diet    Follow-up in 6 months with labs    The longitudinal plan of care for IgAN, gross hematuria, proteinuria was addressed during this visit. Due to the added complexity in care, I will continue to support Alissacan Gloriain the subsequent management of this condition(s) and with the ongoing continuity of care of this condition(s).    I spent  30 minutes on the date of the encounter doing chart review, history and exam, documentation and further activities as noted above. 25 minutes of this visit is dedicated to direct patient interaction via face-to-face.    Young Holland MD on 07/01/2024            Again, thank you for allowing me to participate in the care of your patient.      Sincerely,    Young Holland MD

## 2024-07-01 NOTE — NURSING NOTE
Chief Complaint   Patient presents with    RECHECK     3 month follow up.      Vitals:    07/01/24 1323 07/01/24 1329 07/01/24 1331 07/01/24 1332   BP: 120/75 124/76 124/77 123/76   BP Location: Right arm Right arm Right arm    Patient Position: Sitting Sitting Sitting    Cuff Size: Adult Regular Adult Regular Adult Regular    Pulse: 81      SpO2: 97%      Weight: 85.7 kg (188 lb 14.4 oz)          BP Readings from Last 3 Encounters:   07/01/24 123/76   05/23/24 112/69   05/17/24 119/72       /76   Pulse 81   Wt 85.7 kg (188 lb 14.4 oz)   SpO2 97%   BMI 24.92 kg/m       Joanie Mayfield

## 2024-07-01 NOTE — PATIENT INSTRUCTIONS
Increase losartan to 1 tab daily (25 mg)  Monitor blood pressure and keep <130 mmHg  See you in 6 months with labs

## 2024-07-02 ENCOUNTER — TELEPHONE (OUTPATIENT)
Dept: MULTI SPECIALTY CLINIC | Facility: CLINIC | Age: 19
End: 2024-07-02
Payer: COMMERCIAL

## 2024-07-02 NOTE — TELEPHONE ENCOUNTER
Sent excuse letter per patient request to   [phone].  Gilma Kingsley RN, BSN, PHN  [facility] Nephrology Nurse Navigator  [phone]

## 2024-07-02 NOTE — TELEPHONE ENCOUNTER
[facility] Call Center    Phone Message    May a detailed message be left on voicemail: yes     Reason for Call: Other: Pt is needing a note faxed to his work for his appointment yesterday. Please fax to [phone]. Pt requested [employer] message be sent to let him know this is done. Thanks      Action Taken: Other: uro    Travel Screening: Not Applicable     Date of Service:

## 2024-07-02 NOTE — LETTER
To Whom It MayConcern:       Mayco PALMA Gloria  was seen in our Nephrology clinic on 7/1/2024          Sincerely,       Young Holland M.D., GABY   of Medicine  Division of Nephrology and Hypertension  [facility]

## 2024-07-02 NOTE — LETTER
To Whom It MayConcern:       Mayco Castro  was seen at [facility] on 7/1/2024.  Please excuse his absence from work.       Patient may return to work on 7/2/24 without restrictions.                                Sincerely,       Young Holland M.D., GABY   of Medicine  Division of Nephrology and Hypertension  [facility]

## 2025-01-19 ENCOUNTER — HEALTH MAINTENANCE LETTER (OUTPATIENT)
Age: 20
End: 2025-01-19

## 2025-02-03 ENCOUNTER — LAB (OUTPATIENT)
Dept: LAB | Facility: CLINIC | Age: 20
End: 2025-02-03
Attending: INTERNAL MEDICINE
Payer: COMMERCIAL

## 2025-02-03 ENCOUNTER — OFFICE VISIT (OUTPATIENT)
Dept: NEPHROLOGY | Facility: CLINIC | Age: 20
End: 2025-02-03
Attending: INTERNAL MEDICINE
Payer: COMMERCIAL

## 2025-02-03 VITALS
SYSTOLIC BLOOD PRESSURE: 123 MMHG | HEIGHT: 72 IN | OXYGEN SATURATION: 97 % | HEART RATE: 78 BPM | BODY MASS INDEX: 27.54 KG/M2 | DIASTOLIC BLOOD PRESSURE: 79 MMHG | WEIGHT: 203.3 LBS | TEMPERATURE: 98.9 F

## 2025-02-03 DIAGNOSIS — N02.B9 IGA NEPHROPATHY: ICD-10-CM

## 2025-02-03 DIAGNOSIS — R80.1 PERSISTENT PROTEINURIA: ICD-10-CM

## 2025-02-03 DIAGNOSIS — N02.B9 IGA NEPHROPATHY: Primary | ICD-10-CM

## 2025-02-03 LAB
ALBUMIN MFR UR ELPH: 28.2 MG/DL
ALBUMIN SERPL BCG-MCNC: 4.5 G/DL (ref 3.5–5.2)
ALBUMIN UR-MCNC: NEGATIVE MG/DL
ANION GAP SERPL CALCULATED.3IONS-SCNC: 10 MMOL/L (ref 7–15)
APPEARANCE UR: CLEAR
BILIRUB UR QL STRIP: NEGATIVE
BUN SERPL-MCNC: 13.5 MG/DL (ref 6–20)
CALCIUM SERPL-MCNC: 9.9 MG/DL (ref 8.8–10.4)
CHLORIDE SERPL-SCNC: 105 MMOL/L (ref 98–107)
COLOR UR AUTO: ABNORMAL
CREAT SERPL-MCNC: 0.93 MG/DL (ref 0.67–1.17)
CREAT UR-MCNC: 44.5 MG/DL
EGFRCR SERPLBLD CKD-EPI 2021: >90 ML/MIN/1.73M2
ERYTHROCYTE [DISTWIDTH] IN BLOOD BY AUTOMATED COUNT: 11.9 % (ref 10–15)
ERYTHROCYTE [SEDIMENTATION RATE] IN BLOOD BY WESTERGREN METHOD: 6 MM/HR (ref 0–15)
GLUCOSE SERPL-MCNC: 76 MG/DL (ref 70–99)
GLUCOSE UR STRIP-MCNC: NEGATIVE MG/DL
HCO3 SERPL-SCNC: 26 MMOL/L (ref 22–29)
HCT VFR BLD AUTO: 45.5 % (ref 40–53)
HGB BLD-MCNC: 16.4 G/DL (ref 13.3–17.7)
HGB UR QL STRIP: ABNORMAL
KETONES UR STRIP-MCNC: NEGATIVE MG/DL
LEUKOCYTE ESTERASE UR QL STRIP: NEGATIVE
MCH RBC QN AUTO: 30.3 PG (ref 26.5–33)
MCHC RBC AUTO-ENTMCNC: 36 G/DL (ref 31.5–36.5)
MCV RBC AUTO: 84 FL (ref 78–100)
NITRATE UR QL: NEGATIVE
PH UR STRIP: 7 [PH] (ref 5–7)
PHOSPHATE SERPL-MCNC: 3.1 MG/DL (ref 2.5–4.5)
PLATELET # BLD AUTO: 236 10E3/UL (ref 150–450)
POTASSIUM SERPL-SCNC: 4.2 MMOL/L (ref 3.4–5.3)
PROT/CREAT 24H UR: 0.63 MG/MG CR (ref 0–0.2)
RBC # BLD AUTO: 5.41 10E6/UL (ref 4.4–5.9)
RBC URINE: 18 /HPF
SODIUM SERPL-SCNC: 141 MMOL/L (ref 135–145)
SP GR UR STRIP: 1.01 (ref 1–1.03)
UROBILINOGEN UR STRIP-MCNC: NORMAL MG/DL
WBC # BLD AUTO: 6.3 10E3/UL (ref 4–11)
WBC URINE: 1 /HPF

## 2025-02-03 PROCEDURE — 80069 RENAL FUNCTION PANEL: CPT | Performed by: PATHOLOGY

## 2025-02-03 PROCEDURE — 85027 COMPLETE CBC AUTOMATED: CPT | Performed by: PATHOLOGY

## 2025-02-03 PROCEDURE — 36415 COLL VENOUS BLD VENIPUNCTURE: CPT | Performed by: PATHOLOGY

## 2025-02-03 PROCEDURE — 99213 OFFICE O/P EST LOW 20 MIN: CPT | Performed by: INTERNAL MEDICINE

## 2025-02-03 PROCEDURE — 85652 RBC SED RATE AUTOMATED: CPT | Performed by: PATHOLOGY

## 2025-02-03 PROCEDURE — 84156 ASSAY OF PROTEIN URINE: CPT | Performed by: PATHOLOGY

## 2025-02-03 PROCEDURE — 81001 URINALYSIS AUTO W/SCOPE: CPT | Performed by: PATHOLOGY

## 2025-02-03 RX ORDER — LOSARTAN POTASSIUM 25 MG/1
50 TABLET ORAL DAILY
Qty: 60 TABLET | Refills: 6 | Status: SHIPPED | OUTPATIENT
Start: 2025-02-03

## 2025-02-03 ASSESSMENT — PAIN SCALES - GENERAL: PAINLEVEL_OUTOF10: MODERATE PAIN (5)

## 2025-02-03 NOTE — PROGRESS NOTES
Nephrology Progress Note  02/03/2025   Chief complaint: Follow up for presumed IgAN  History of Present Illness:    Mayco Castro is a 19 year old M with prior Hx of myocarditis post insect sting who is here for IgAN follow-up.      The patient was in his usual state of health until he has head cold and woke up in the morning with dark urine. He hen developed fever and urine become more red. He has no pain when urinate.  Patient went to see his primary care on 11/27/23 and his UA did show large blood and urine RBC more than 100.  UPCR was 1.8 g/g.  Cr 0.96 with eGFR >90. CT abdomen and pelvis with contrast on 11/27/23 showed normal kidney and bladder.  He was then referred to urology on 11/30/2023.  Who ordered CT urogram which was done on 12/12/2020 which she will normal kidney and bladder, no mass stone or obstruction.  Urology plan for cystoscopy which was scheduled for tomorrow.     Today, he reports that he feels good and the urine color has returned to normal.  He reported that the urine was clear after 1 week of onset.  He also noticed some foamy urine but this is also improved as well.  His dad has history of Buerger's disease and he has hematuria when he got sick.  The onset was an 9-year-old and he does not follow with a kidney doctor currently.  Unclear what is the kidney function at this time.  No other family member has kidney disease.  His dad is currently 58 years old.     Previously, the patient got bit by an insect and developed myocarditis.  He was prescribed lisinopril but since he recovered, lisinopril was discontinued.  Back then he has no side effects with lisinopril.  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     12/14/23: He feels good. Urine started to clear up after 1 week.  He denies any rashes or joint pain.  He had lobar back pain on the left side that radiated to his left leg and he also has numbness on his left toe.  His appetite was  down a little bit but now back to normal.  He has lost about 12 pounds.  He denies any nausea vomiting or diarrhea.  He does not smoke or drink.  He works in welding business. He has UA done on 11/27/2023 which showed protein >=300 mg/dl, large blood and RBC more than 100. UPCR 1.83 mg/g. Cr was 0.96 and CRP 79.90. Some serologies was done which showed C3 125, C4 29, ANCA negative, LUZMARIA negative, chlamydia gonorrhea negative.  COVID, influenza, hepatitis C and HIV are negative.  Strep screen, syphilis are negative.  Urine culture negative.     Labs today show renal panel is pending.  UA showed large blood in the urine, RBC 37 cells and UPCR 0.41 g/g. US renal     3/18/24: He has been feeling good.  He still has foamy in the urine.  Patient stopped taking lisinopril as he does not feel good after taking the meds.  He felt that something wrong with in his chest.  However he does not measure blood pressure to see whether he developed hypotension or not.  He typically takes the meds in the evening but it still does not help with the symptoms.  Patient recently visited [place] and had some head colds.  At that time he had gross hematuria for couple days and spontaneously resolved. Labs today show creatinine 0.92, BUN 16, EGFR more than 90, potassium 4,carbon dioxide 26, CRP less than 3.  Hemoglobin 15.3, platelet 223.  UA showed large blood with RBC 68. UPCR 0.69 g/g.     5/23/24: In the interim, he has recurrent gross hematuria. He underwent a kidney Bx on 5/17/24 which showed IgA nephropathy, M1 E0 S0 T0 C0; cv1 ah1, <10% IFTA, no glomerulosclerosis in the sample.  He is feeling well today.  He reported he felt really tired and nap all the time when he was getting sick.  He no longer has gross hematuria.  No rashes or joint pain. Labs today showed creatinine 1.04, GFR more than 90, BUN 20.2, potassium 4.4 and bicarb 28.  Albumin 4.2.  CRP less than 3.  White blood cell 6.9 hemoglobin 14.3 and platelet 246.  UA showed  large blood and RBCs 101. UPCR is 0.48 from 1.44 in 5/8/24.     7/1/24: He has been feeling good with no flare ups of igA nephropathy, since his last visit. He does not have any edema, SOB, joint pain, rash, abdominal pain, chest pain, foamy urine or dysuria.   Labs today showed creatinine 0.79, BUN 16.5, potassium 4, bicarb 25, calcium 9.9, phosphorus 3.2, albumin 4.6, CRP less than 3, and ESR of 6.  White blood cell 7.1, hemoglobin 14.9, platelet 235.  UA showed small blood, RBC 60/hpf. UPCR is 0.71 from 0.48.     2/3/2025: Today he has been doing well.  No recent illnesses.  He stopped drinking because he notices that his urine is more foamy when he drinks alcohol.  He denies any lower extremity edema.  He does not measure his blood pressure at home but he also denies any dizziness or lightheadedness.  Blood pressure today is 123/79.  Labs today showed creatinine 0.93, albumin 4.5, potassium 4.2, bicarb 26.  CBC show white blood cell 6.3, hemoglobin 16.4, platelet 236.  Sed rate 6.  UA showed moderate blood and RBC 18. UPCR pending.     Past medical history  No past medical history on file.    Past surgical history  Past Surgical History:   Procedure Laterality Date    IR RENAL BIOPSY RIGHT  5/17/2024       Review of Systems:   14 systems were reviewed and all negative except as mentioned above.   Current Medications:  Current Outpatient Medications   Medication Sig Dispense Refill    losartan (COZAAR) 25 MG tablet Take 1 tablet (25 mg) by mouth daily 30 tablet 6     No current facility-administered medications for this visit.       Physical Exam:   /79 (BP Location: Left arm, Patient Position: Sitting, Cuff Size: Adult Regular)   Pulse 78   Temp 98.9  F (37.2  C) (Oral)   Ht 1.829 m (6')   Wt 92.2 kg (203 lb 4.8 oz)   SpO2 97%   BMI 27.57 kg/m     Body mass index is 27.57 kg/m .    GENERAL APPEARANCE: Alert, not in acute distress  EYES:  Not pale conjunctiva, pupils equal  HENT: Mouth without ulcers  or lesions  PULM: lungs clear to auscultation bilaterally, equal air movement, no clubbing  CV: regular rhythm, normal rate, no rub     -JVD no distended.      -edema: none  GI: soft,  - tender, no distended, bowel sounds are present  INTEGUMENT: No rash  NEURO:  Non focal. No asterixis.     Labs:   All labs reviewed by me  Last Renal Panel:  Sodium   Date Value Ref Range Status   02/03/2025 141 135 - 145 mmol/L Final     Potassium   Date Value Ref Range Status   02/03/2025 4.2 3.4 - 5.3 mmol/L Final     Chloride   Date Value Ref Range Status   02/03/2025 105 98 - 107 mmol/L Final     Carbon Dioxide (CO2)   Date Value Ref Range Status   02/03/2025 26 22 - 29 mmol/L Final     Anion Gap   Date Value Ref Range Status   02/03/2025 10 7 - 15 mmol/L Final     Glucose   Date Value Ref Range Status   02/03/2025 76 70 - 99 mg/dL Final     Urea Nitrogen   Date Value Ref Range Status   02/03/2025 13.5 6.0 - 20.0 mg/dL Final     Creatinine   Date Value Ref Range Status   02/03/2025 0.93 0.67 - 1.17 mg/dL Final     GFR Estimate   Date Value Ref Range Status   02/03/2025 >90 >60 mL/min/1.73m2 Final     Comment:     eGFR calculated using 2021 CKD-EPI equation.     Calcium   Date Value Ref Range Status   02/03/2025 9.9 8.8 - 10.4 mg/dL Final     Phosphorus   Date Value Ref Range Status   02/03/2025 3.1 2.5 - 4.5 mg/dL Final     Albumin   Date Value Ref Range Status   02/03/2025 4.5 3.5 - 5.2 g/dL Final       Imaging:  I reviewed imaging studies.   US renal on 12/14/23:    HISTORY: Proteinuria/hematuria     TECHNIQUE: The kidneys and bladder were scanned in the standard  fashion with specialized ultrasound transducer(s) using both gray  scale and limited color/spectral Doppler techniques.     FINDINGS:     Right kidney: Measures 11.1 cm in length. Parenchyma is of normal  thickness and echogenicity. No focal mass. No hydronephrosis.     Left kidney: Measures 9.3 cm in length. Parenchyma is of normal  thickness and echogenicity. No  focal mass. No hydronephrosis.      Bladder: Unremarkable.                                                                      IMPRESSION:  Normal renal ultrasound.      I have personally reviewed the examination and initial interpretation  and I agree with the findings.  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 His kidney function remained normal Cr 0.9-1.0. UA show RBC more than 100 and UPCR 1.8 g/g in November 2023.  He has no nephrotic syndrome given normal serum albumin and no swelling. Serological workup to include LUZMARIA, ANCA, chlamydia, gonorrhea, COVID, influenza, hepatitis C, HIV, strep screen, syphilis were all negative.  Urine culture is negative.  CT urogram was unremarkable.  Moreover, his dad also has IgA when he was 11, known biopsy confirmed. initially saw the patient on 12/14/2023 and at that time he has been 0.68 and urine RBC 37.  I started him on lisinopril 2.5 mg/day but he could not tolerated due to chest symptoms. 3/18/24: He then reported intermittent gross hematuria. Then the creatinine was 0.92 and UPCR had increased to 0.69 g/g.  He then has another episode of gross hematuria on 5/8/2024.  He went to the ER and ultrasound was unremarkable.  Repeat labs at that time show RBC more than 182 and urine protein 1.44 g/g.  So we proceed with a kidney biopsy on 5/17/2024.  No complication.  Biopsy showed IgA nephropathy, M1 E0 S0 T0 C0; cv1 ah1, <10% IFTA, no glomerulosclerosis.  I increased the dose of losartan from 12.5 to 25 mg/day since 7/2024 given worsening proteinuria at that time from 0.48 to 0.71 g/g.   At this time his creatinine is stable at 0.9 with GFR more than 90.  His proteinuria has improved slightly down from 0.71 g/g to 0.63 g/g.  However, his urine RBC has improved and down to 18 cells per high-power field.  Discussed with him about the importance of bringing the proteinuria down to less than 0.5 and ideally less than 0.3 g/g.  Discussed with him about Filspari versus increasing losartan.  The patient would like to try increasing the dose of losartan first.  So far it does not meet any indication for immunosuppression.    -Will increase losartan from 12.5 mg/day to 25 mg/day  - Asked him to monitor blood pressure and how he feels; if he does not feel good then he should monitor his  blood pressure ensure that he has no hypertension, lightheadedness or dizziness   - OK to take Fish oil  - Next step would be Filspari of proteinuria does not get <0.5 g/g  - Follow-up in 6 months with labs CBC, renal panel, UA, urine protein  - Discussed low salt diet    Follow-up in 6 months with labs    The longitudinal plan of care for IgAN, persistent hematuria, persistent proteinuria was addressed during this visit. Due to the added complexity in care, I will continue to support Mayco Sanchez the subsequent management of this condition(s) and with the ongoing continuity of care of this condition(s).    I spent  30 minutes on the date of the encounter doing chart review, history and exam, documentation and further activities as noted above. 20 minutes of this visit is dedicated to direct patient interaction via face-to-face.    Young Holland MD on 02/03/2025

## 2025-02-03 NOTE — NURSING NOTE
Chief Complaint   Patient presents with    RECHECK     Follow up. PT reports pain from back surgery,      Vitals:    02/03/25 1316   BP: 123/79   BP Location: Left arm   Patient Position: Sitting   Cuff Size: Adult Regular   Pulse: 78   Temp: 98.9  F (37.2  C)   TempSrc: Oral   SpO2: 97%   Weight: 92.2 kg (203 lb 4.8 oz)   Height: 1.829 m (6')       BP Readings from Last 3 Encounters:   02/03/25 123/79   07/01/24 123/76   05/23/24 112/69       /79 (BP Location: Left arm, Patient Position: Sitting, Cuff Size: Adult Regular)   Pulse 78   Temp 98.9  F (37.2  C) (Oral)   Ht 1.829 m (6')   Wt 92.2 kg (203 lb 4.8 oz)   SpO2 97%   BMI 27.57 kg/m       Sunshine Heymans

## 2025-02-03 NOTE — LETTER
2/3/2025       RE: Mayco Castro  [address] [facility] [address] [facility] [address]     Dear Colleague,    Thank you for referring your patient, Mayco Castro, to the [facility] [place] [facility]. Please see a copy of my visit note below.      Nephrology Progress Note  02/03/2025   Chief complaint: Follow up for presumed IgAN  History of Present Illness:    Mayco Castro is a 19 year old M with prior Hx of myocarditis post insect sting who is here for IgAN follow-up.      The patient was in his usual state of health until he has head cold and woke up in the morning with dark urine. He hen developed fever and urine become more red. He has no pain when urinate.  Patient went to see his primary care on 11/27/23 and his UA did show large blood and urine RBC more than 100.  UPCR was 1.8 g/g.  Cr 0.96 with eGFR >90. CT abdomen and pelvis with contrast on 11/27/23 showed normal kidney and bladder.  He was then referred to urology on 11/30/2023.  Who ordered CT urogram which was done on 12/12/2020 which she will normal kidney and bladder, no mass stone or obstruction.  Urology plan for cystoscopy which was scheduled for tomorrow.     Today, he reports that he feels good and the urine color has returned to normal.  He reported that the urine was clear after 1 week of onset.  He also noticed some foamy urine but this is also improved as well.  His dad has history of Buerger's disease and he has hematuria when he got sick.  The onset was an 9-year-old and he does not follow with a kidney doctor currently.  Unclear what is the kidney function at this time.  No other family member has kidney disease.  His dad is currently 58 years old.     Previously, the patient got bit by an insect and developed myocarditis.  He was prescribed lisinopril but since he recovered, lisinopril was discontinued.  Back then he has no side effects with lisinopril.  He also had history  of this herniation and underwent surgery when he was 15 years old.     12/14/23: He feels good. Urine started to clear up after 1 week.  He denies any rashes or joint pain.  He had lobar back pain on the left side that radiated to his left leg and he also has numbness on his left toe.  His appetite was down a little bit but now back to normal.  He has lost about 12 pounds.  He denies any nausea vomiting or diarrhea.  He does not smoke or drink.  He works in welding business. He has UA done on 11/27/2023 which showed protein >=300 mg/dl, large blood and RBC more than 100. UPCR 1.83 mg/g. Cr was 0.96 and CRP 79.90. Some serologies was done which showed C3 125, C4 29, ANCA negative, LUZMARIA negative, chlamydia gonorrhea negative.  COVID, influenza, hepatitis C and HIV are negative.  Strep screen, syphilis are negative.  Urine culture negative.     Labs today show renal panel is pending.  UA showed large blood in the urine, RBC 37 cells and UPCR 0.41 g/g. US renal     3/18/24: He has been feeling good.  He still has foamy in the urine.  Patient stopped taking lisinopril as he does not feel good after taking the meds.  He felt that something wrong with in his chest.  However he does not measure blood pressure to see whether he developed hypotension or not.  He typically takes the meds in the evening but it still does not help with the symptoms.  Patient recently visited [place] and had some head colds.  At that time he had gross hematuria for couple days and spontaneously resolved. Labs today show creatinine 0.92, BUN 16, EGFR more than 90, potassium 4,carbon dioxide 26, CRP less than 3.  Hemoglobin 15.3, platelet 223.  UA showed large blood with RBC 68. UPCR 0.69 g/g.     5/23/24: In the interim, he has recurrent gross hematuria. He underwent a kidney Bx on 5/17/24 which showed IgA nephropathy, M1 E0 S0 T0 C0; cv1 ah1, <10% IFTA, no glomerulosclerosis in the sample.  He is feeling well today.  He reported he felt really  tired and nap all the time when he was getting sick.  He no longer has gross hematuria.  No rashes or joint pain. Labs today showed creatinine 1.04, GFR more than 90, BUN 20.2, potassium 4.4 and bicarb 28.  Albumin 4.2.  CRP less than 3.  White blood cell 6.9 hemoglobin 14.3 and platelet 246.  UA showed large blood and RBCs 101. UPCR is 0.48 from 1.44 in 5/8/24.     7/1/24: He has been feeling good with no flare ups of igA nephropathy, since his last visit. He does not have any edema, SOB, joint pain, rash, abdominal pain, chest pain, foamy urine or dysuria.   Labs today showed creatinine 0.79, BUN 16.5, potassium 4, bicarb 25, calcium 9.9, phosphorus 3.2, albumin 4.6, CRP less than 3, and ESR of 6.  White blood cell 7.1, hemoglobin 14.9, platelet 235.  UA showed small blood, RBC 60/hpf. UPCR is 0.71 from 0.48.     2/3/2025: Today he has been doing well.  No recent illnesses.  He stopped drinking because he notices that his urine is more foamy when he drinks alcohol.  He denies any lower extremity edema.  He does not measure his blood pressure at home but he also denies any dizziness or lightheadedness.  Blood pressure today is 123/79.  Labs today showed creatinine 0.93, albumin 4.5, potassium 4.2, bicarb 26.  CBC show white blood cell 6.3, hemoglobin 16.4, platelet 236.  Sed rate 6.  UA showed moderate blood and RBC 18. UPCR pending.     Past medical history  No past medical history on file.    Past surgical history  Past Surgical History:   Procedure Laterality Date     IR RENAL BIOPSY RIGHT  5/17/2024       Review of Systems:   14 systems were reviewed and all negative except as mentioned above.   Current Medications:  Current Outpatient Medications   Medication Sig Dispense Refill     losartan (COZAAR) 25 MG tablet Take 1 tablet (25 mg) by mouth daily 30 tablet 6     No current facility-administered medications for this visit.       Physical Exam:   /79 (BP Location: Left arm, Patient Position: Sitting,  Cuff Size: Adult Regular)   Pulse 78   Temp 98.9  F (37.2  C) (Oral)   Ht 1.829 m (6')   Wt 92.2 kg (203 lb 4.8 oz)   SpO2 97%   BMI 27.57 kg/m     Body mass index is 27.57 kg/m .    GENERAL APPEARANCE: Alert, not in acute distress  EYES:  Not pale conjunctiva, pupils equal  HENT: Mouth without ulcers or lesions  PULM: lungs clear to auscultation bilaterally, equal air movement, no clubbing  CV: regular rhythm, normal rate, no rub     -JVD no distended.      -edema: none  GI: soft,  - tender, no distended, bowel sounds are present  INTEGUMENT: No rash  NEURO:  Non focal. No asterixis.     Labs:   All labs reviewed by me  Last Renal Panel:  Sodium   Date Value Ref Range Status   02/03/2025 141 135 - 145 mmol/L Final     Potassium   Date Value Ref Range Status   02/03/2025 4.2 3.4 - 5.3 mmol/L Final     Chloride   Date Value Ref Range Status   02/03/2025 105 98 - 107 mmol/L Final     Carbon Dioxide (CO2)   Date Value Ref Range Status   02/03/2025 26 22 - 29 mmol/L Final     Anion Gap   Date Value Ref Range Status   02/03/2025 10 7 - 15 mmol/L Final     Glucose   Date Value Ref Range Status   02/03/2025 76 70 - 99 mg/dL Final     Urea Nitrogen   Date Value Ref Range Status   02/03/2025 13.5 6.0 - 20.0 mg/dL Final     Creatinine   Date Value Ref Range Status   02/03/2025 0.93 0.67 - 1.17 mg/dL Final     GFR Estimate   Date Value Ref Range Status   02/03/2025 >90 >60 mL/min/1.73m2 Final     Comment:     eGFR calculated using 2021 CKD-EPI equation.     Calcium   Date Value Ref Range Status   02/03/2025 9.9 8.8 - 10.4 mg/dL Final     Phosphorus   Date Value Ref Range Status   02/03/2025 3.1 2.5 - 4.5 mg/dL Final     Albumin   Date Value Ref Range Status   02/03/2025 4.5 3.5 - 5.2 g/dL Final       Imaging:  I reviewed imaging studies.   US renal on 12/14/23:    HISTORY: Proteinuria/hematuria     TECHNIQUE: The kidneys and bladder were scanned in the standard  fashion with specialized ultrasound transducer(s) using  both gray  scale and limited color/spectral Doppler techniques.     FINDINGS:     Right kidney: Measures 11.1 cm in length. Parenchyma is of normal  thickness and echogenicity. No focal mass. No hydronephrosis.     Left kidney: Measures 9.3 cm in length. Parenchyma is of normal  thickness and echogenicity. No focal mass. No hydronephrosis.      Bladder: Unremarkable.                                                                      IMPRESSION:  Normal renal ultrasound.      I have personally reviewed the examination and initial interpretation  and I agree with the findings.  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 His kidney function remained normal Cr 0.9-1.0. UA show RBC more than 100 and UPCR 1.8 g/g in November 2023.  He has no nephrotic syndrome given normal serum albumin and no swelling. Serological workup to include LUZMARIA, ANCA, chlamydia, gonorrhea, COVID, influenza, hepatitis C, HIV, strep screen, syphilis were all negative.  Urine culture is negative.  CT urogram was unremarkable.  Moreover, his dad also has IgA when he was 11, known biopsy confirmed. initially saw the patient on 12/14/2023 and at that time he has been 0.68 and urine RBC 37.  I started him on lisinopril 2.5 mg/day but he could not tolerated due to chest symptoms. 3/18/24: He then reported intermittent gross hematuria. Then the creatinine was 0.92 and UPCR had increased to 0.69 g/g.  He then has another episode of gross hematuria on 5/8/2024.  He went to the ER and ultrasound was unremarkable.  Repeat labs at that time show RBC more than 182 and urine  protein 1.44 g/g.  So we proceed with a kidney biopsy on 5/17/2024.  No complication.  Biopsy showed IgA nephropathy, M1 E0 S0 T0 C0; cv1 ah1, <10% IFTA, no glomerulosclerosis.  I increased the dose of losartan from 12.5 to 25 mg/day since 7/2024 given worsening proteinuria at that time from 0.48 to 0.71 g/g.  At this time his creatinine is stable at 0.9 with GFR more than 90.  His proteinuria has improved slightly down from 0.71 g/g to 0.63 g/g.  However, his urine RBC has improved and down to 18 cells per high-power field.  Discussed with him about the importance of bringing the proteinuria down to less than 0.5 and ideally less than 0.3 g/g.  Discussed with him about Filspari versus increasing losartan.  The patient would like to try increasing the dose of losartan first.  So far it does not meet any indication for immunosuppression.    -Will increase losartan from 12.5 mg/day to 25 mg/day  - Asked him to monitor blood pressure and how he feels; if he does not feel good then he should monitor his  blood pressure ensure that he has no hypertension, lightheadedness or dizziness   - OK to take Fish oil  - Next step would be Filspari of proteinuria does not get <0.5 g/g  - Follow-up in 6 months with labs CBC, renal panel, UA, urine protein  - Discussed low salt diet    Follow-up in 6 months with labs    The longitudinal plan of care for IgAN, persistent hematuria, persistent proteinuria was addressed during this visit. Due to the added complexity in care, I will continue to support Mayco Sanchez the subsequent management of this condition(s) and with the ongoing continuity of care of this condition(s).    I spent  30 minutes on the date of the encounter doing chart review, history and exam, documentation and further activities as noted above. 20 minutes of this visit is dedicated to direct patient interaction via face-to-face.    Young Holland MD on 02/03/2025            Again, thank you for allowing me to  participate in the care of your patient.      Sincerely,    Young Holland MD

## 2025-02-03 NOTE — PATIENT INSTRUCTIONS
Increase losartan to 50 mg per day (25 mg 2 tabs)  Goal blood pressure <130/80 mmHg  Drink a lot of water  See you in 6 months with labs

## 2025-02-11 ENCOUNTER — TELEPHONE (OUTPATIENT)
Dept: NEPHROLOGY | Facility: CLINIC | Age: 20
End: 2025-02-11
Payer: COMMERCIAL

## 2025-02-11 NOTE — TELEPHONE ENCOUNTER
Left Voicemail (1st Attempt) for the patient to call back and schedule the following:    Appointment type: [facility]  Provider: YASMIN  Return date: 08/03/2025  Specialty phone number: [phone]  Additional appointment(s) needed: LABS  Additonal Notes: N/A

## 2025-03-04 ENCOUNTER — TELEPHONE (OUTPATIENT)
Dept: NEPHROLOGY | Facility: CLINIC | Age: 20
End: 2025-03-04
Payer: COMMERCIAL

## 2025-03-04 NOTE — TELEPHONE ENCOUNTER
Patient confirmed scheduled appointment:  Date: August 14th 2025  Time: 4pm  Visit type: Return Glomerular  Provider: Dr. Holland  Location: [facility]  Testing/imaging: Labs 1hr prior  Additional notes: NA

## 2025-08-14 ENCOUNTER — PATIENT OUTREACH (OUTPATIENT)
Dept: NEPHROLOGY | Facility: CLINIC | Age: 20
End: 2025-08-14

## 2025-08-14 ENCOUNTER — OFFICE VISIT (OUTPATIENT)
Dept: NEPHROLOGY | Facility: CLINIC | Age: 20
End: 2025-08-14
Attending: INTERNAL MEDICINE
Payer: COMMERCIAL

## 2025-08-14 ENCOUNTER — ALLIED HEALTH/NURSE VISIT (OUTPATIENT)
Dept: NEPHROLOGY | Facility: CLINIC | Age: 20
End: 2025-08-14

## 2025-08-14 ENCOUNTER — LAB (OUTPATIENT)
Dept: LAB | Facility: CLINIC | Age: 20
End: 2025-08-14
Attending: INTERNAL MEDICINE
Payer: COMMERCIAL

## 2025-08-14 VITALS
HEIGHT: 72 IN | WEIGHT: 209.1 LBS | BODY MASS INDEX: 28.32 KG/M2 | SYSTOLIC BLOOD PRESSURE: 132 MMHG | DIASTOLIC BLOOD PRESSURE: 79 MMHG | HEART RATE: 74 BPM | OXYGEN SATURATION: 98 %

## 2025-08-14 DIAGNOSIS — N02.B9 IGA NEPHROPATHY: ICD-10-CM

## 2025-08-14 DIAGNOSIS — R31.0 GROSS HEMATURIA: ICD-10-CM

## 2025-08-14 DIAGNOSIS — N02.B9 IGA NEPHROPATHY: Primary | ICD-10-CM

## 2025-08-14 DIAGNOSIS — R80.1 PERSISTENT PROTEINURIA: Primary | ICD-10-CM

## 2025-08-14 LAB
ALBUMIN MFR UR ELPH: 127 MG/DL
ALBUMIN SERPL BCG-MCNC: 4.7 G/DL (ref 3.5–5.2)
ALBUMIN UR-MCNC: 100 MG/DL
ANION GAP SERPL CALCULATED.3IONS-SCNC: 13 MMOL/L (ref 7–15)
APPEARANCE UR: CLEAR
BILIRUB UR QL STRIP: NEGATIVE
BUN SERPL-MCNC: 14.8 MG/DL (ref 6–20)
CALCIUM SERPL-MCNC: 10 MG/DL (ref 8.8–10.4)
CHLORIDE SERPL-SCNC: 102 MMOL/L (ref 98–107)
COLOR UR AUTO: ABNORMAL
CREAT SERPL-MCNC: 0.91 MG/DL (ref 0.67–1.17)
CREAT UR-MCNC: 164 MG/DL
EGFRCR SERPLBLD CKD-EPI 2021: >90 ML/MIN/1.73M2
ERYTHROCYTE [DISTWIDTH] IN BLOOD BY AUTOMATED COUNT: 12 % (ref 10–15)
ERYTHROCYTE [SEDIMENTATION RATE] IN BLOOD BY WESTERGREN METHOD: 2 MM/HR (ref 0–15)
GLUCOSE SERPL-MCNC: 79 MG/DL (ref 70–99)
GLUCOSE UR STRIP-MCNC: NEGATIVE MG/DL
HCO3 SERPL-SCNC: 24 MMOL/L (ref 22–29)
HCT VFR BLD AUTO: 42 % (ref 40–53)
HGB BLD-MCNC: 15.5 G/DL (ref 13.3–17.7)
HGB UR QL STRIP: ABNORMAL
HYALINE CASTS: 1 /LPF
KETONES UR STRIP-MCNC: NEGATIVE MG/DL
LEUKOCYTE ESTERASE UR QL STRIP: NEGATIVE
MCH RBC QN AUTO: 30.5 PG (ref 26.5–33)
MCHC RBC AUTO-ENTMCNC: 36.9 G/DL (ref 31.5–36.5)
MCV RBC AUTO: 82.7 FL (ref 78–100)
MUCOUS THREADS #/AREA URNS LPF: PRESENT /LPF
NITRATE UR QL: NEGATIVE
PH UR STRIP: 7 [PH] (ref 5–7)
PHOSPHATE SERPL-MCNC: 3.3 MG/DL (ref 2.5–4.5)
PLATELET # BLD AUTO: 231 10E3/UL (ref 150–450)
POTASSIUM SERPL-SCNC: 4.2 MMOL/L (ref 3.4–5.3)
PROT/CREAT 24H UR: 0.77 MG/MG CR (ref 0–0.2)
RBC # BLD AUTO: 5.08 10E6/UL (ref 4.4–5.9)
RBC URINE: 111 /HPF
SODIUM SERPL-SCNC: 139 MMOL/L (ref 135–145)
SP GR UR STRIP: 1.02 (ref 1–1.03)
SQUAMOUS EPITHELIAL: <1 /HPF
UROBILINOGEN UR STRIP-MCNC: NORMAL MG/DL
WBC # BLD AUTO: 6.03 10E3/UL (ref 4–11)
WBC URINE: 2 /HPF

## 2025-08-14 PROCEDURE — 99213 OFFICE O/P EST LOW 20 MIN: CPT | Performed by: INTERNAL MEDICINE

## 2025-08-14 RX ORDER — BUDESONIDE 4 MG/1
16 CAPSULE, DELAYED RELEASE ORAL DAILY
Qty: 120 CAPSULE | Refills: 9 | OUTPATIENT
Start: 2025-08-14

## 2025-08-14 RX ORDER — LOSARTAN POTASSIUM 50 MG/1
50 TABLET ORAL DAILY
Qty: 90 TABLET | Refills: 6 | Status: SHIPPED | OUTPATIENT
Start: 2025-08-14

## 2025-08-14 ASSESSMENT — PAIN SCALES - GENERAL: PAINLEVEL_OUTOF10: NO PAIN (0)

## 2025-08-22 ENCOUNTER — VIRTUAL VISIT (OUTPATIENT)
Dept: PHARMACY | Facility: CLINIC | Age: 20
End: 2025-08-22
Attending: INTERNAL MEDICINE
Payer: COMMERCIAL

## 2025-08-22 DIAGNOSIS — N02.B9 IGA NEPHROPATHY: Primary | ICD-10-CM

## 2025-09-02 ENCOUNTER — TELEPHONE (OUTPATIENT)
Dept: MULTI SPECIALTY CLINIC | Facility: CLINIC | Age: 20
End: 2025-09-02
Payer: COMMERCIAL

## (undated) RX ORDER — SODIUM CHLORIDE 9 MG/ML
INJECTION, SOLUTION INTRAVENOUS
Status: DISPENSED
Start: 2024-05-17

## (undated) RX ORDER — LIDOCAINE HYDROCHLORIDE 10 MG/ML
INJECTION, SOLUTION EPIDURAL; INFILTRATION; INTRACAUDAL; PERINEURAL
Status: DISPENSED
Start: 2024-05-17

## (undated) RX ORDER — FENTANYL CITRATE 50 UG/ML
INJECTION, SOLUTION INTRAMUSCULAR; INTRAVENOUS
Status: DISPENSED
Start: 2024-05-17